# Patient Record
Sex: MALE | Race: BLACK OR AFRICAN AMERICAN | Employment: UNEMPLOYED
[De-identification: names, ages, dates, MRNs, and addresses within clinical notes are randomized per-mention and may not be internally consistent; named-entity substitution may affect disease eponyms.]

---

## 2022-02-17 ENCOUNTER — NURSE TRIAGE (OUTPATIENT)
Dept: OTHER | Facility: CLINIC | Age: 42
End: 2022-02-17

## 2022-02-17 NOTE — TELEPHONE ENCOUNTER
Received call from [address] at [facility] with Red Flag Complaint. Subjective: Caller states \"High blood sugar\"     Current Symptoms: blood sugar is high, out of NPH insulin. Just taking regular insulin, has been out of NPH for months, has no idea whet blood sugar is, meter is just reading HIGH. Caller states he had been living in [place] and just recently moved back. Caller was encouraged to go to ER for treatment of hyperglycemia, states he just wants to set up an appointment with a PCP    Onset: a few months ago; gradual    Associated Symptoms: NA    Pain Severity: none    Temperature: n/a     What has been tried: takes regular insulin    LMP: NA Pregnant: NA    Recommended disposition: ER now, caller refuses, wants to get an appointment to establish care only. Risks associated with prolonged hyperglycemia explained    Care advice provided, patient verbalizes understanding; denies any other questions or concerns; instructed to call back for any new or worsening symptoms. Patient/Caller agrees with recommended disposition; writer provided warm transfer to Jitendra Avilez at [facility] for appointment scheduling    Attention Provider: Thank you for allowing me to participate in the care of your patient. The patient was connected to triage in response to information provided to the ECC. Please do not respond through this encounter as the response is not directed to a shared pool.         Reason for Disposition   Blood glucose > 500 mg/dL (27.8 mmol/L)    Protocols used: DIABETES - HIGH BLOOD SUGAR-ADULT-OH

## 2022-02-25 NOTE — PROGRESS NOTES
Subjective:     Chief Complaint   Patient presents with   [address] [place] is a 39 y.o. M. He is noted to have a history of ongoing heroin use and active smoking. The patient also claims to have a history of diabetes. The patient has been seen most recently earlier this month in the emergency room with a complaint of right-sided foot pain. The patient had noted a history of chronic bilateral foot pain related to diabetic neuropathy. Gabapentin, which had been prescribed previously for this, was no longer effective. Physical examination demonstrated tachycardia, as well as a callus on the lateral aspect of the foot without surrounding erythema or warmth. Laboratory studies demonstrated significant hyperglycemia at 409 mg/dL, but was otherwise unremarkable. X-rays of the foot demonstrated no acute abnormality. The patient was felt not likely to have an acute infectious process, and was felt more likely to have an exacerbation of his neuropathy and chronic friction injury. The patient had been told to follow-up with podiatry, and was eventually discharged from the ER with instructions to further follow-up with primary care. Today, the patient comes in to establish care and to have follow-up care on his diabetes and peripheral neuropathy. His main complaint today relates to his peripheral neuropathy, which has been steadily worsening over the past year but has been present for several years. He has been treated for this previously with gabapentin, but no longer finds this effective, and has not taken this in some time. His diabetes has also been very poorly controlled. He has been able to get insulin over-the-counter, he says, and currently uses glargine 20 units at night along with 20 units of NovoLog, 3 times daily, with meals.   Despite this, his blood sugar readings have typically been in the 300 to 400 mg/dL range, with the lowest blood sugar readings that he has been able to get in the 280 mg/dL range. He denies any other hypoglycemic episodes. He continues to have polyuria and polydipsia, and also reports an abnormal weight loss over the past several months of approximately 90 pounds or so. He denies any fevers or chills, chest pain, shortness of breath, or any further cardiopulmonary concerns. His blood pressure readings have been typically about where they are in clinic today. He denies having seen the podiatrist before despite his history of peripheral neuropathy. He also notes similar symptoms of severe numbness, and tingling pain, in his hands as well as the soles of his feet. He brings in disability paperwork for completion, wondering if he will be able to work again given this problem. He does not currently take any medication for cholesterol management. He continues on methadone maintenance therapy for his prior history of heroin use. He denies any prior history of iggy, but does relate to some depression given his worsening health concerns. No suicidal or homicidal ideation. He declines Pneumovax and Tdap vaccination today when offered. His review of systems is otherwise unremarkable. Past Medical History:  Past Medical History:   Diagnosis Date    Current smoker     Diabetes mellitus ([facility] [address]     RX = insulin (historical, per patient)    Diabetic neuropathy ([facility]     RX = Cymbalta    Dyslipidemia     Heroin addiction ([facility]     Methadone maintenance therapy patient [facility])        Past Surgical Histor:  History reviewed. No pertinent surgical history.     Allergies:  No Known Allergies    Medications:  Current Outpatient Medications   Medication Sig Dispense Refill    insulin glargine (LANTUS,BASAGLAR) 100 unit/mL (3 mL) inpn Inject 30 units subcutaneous injection nightly and adjust as per provided scale  Indications: type 2 diabetes mellitus 3 Adjustable Dose Pre-filled Pen Syringe 5    insulin aspart U-100 (NOVOLOG) 100 unit/mL (3 mL) inpn Inject 20 units subcutaneous injection with meals 3 times daily and adjust as per provided scale  Indications: type 2 diabetes mellitus 3 Adjustable Dose Pre-filled Pen Syringe 5    DULoxetine (CYMBALTA) 30 mg capsule Take 1 Capsule by mouth daily for 120 days. Indications: diabetic complication causing injury to some body nerves 30 Capsule 3    METHADONE HCL (METHADONE PO) Take 65 mg by mouth.  oxycodone-acetaminophen (PERCOCET) 5-325 mg per tablet Take 1 Tab by mouth every four (4) hours as needed for Pain. 15 Tab 0    trimethoprim-sulfamethoxazole (BACTRIM DS) 800-160 mg per tablet Take 2 Tabs by mouth two (2) times a day. (Patient not taking: Reported on 3/16/2022) 40 Tab 0       Social History:  Social History     Socioeconomic History    Marital status: SINGLE   Tobacco Use    Smoking status: Current Every Day Smoker    Smokeless tobacco: Never Used   Substance and Sexual Activity    Alcohol use: Yes       Family History:  History reviewed. No pertinent family history. Immunizations: There is no immunization history on file for this patient. Healthcare Maintenance:  Health Maintenance   Topic Date Due    Hepatitis C Screening  Never done    Depression Screen  Never done    COVID-19 Vaccine (1) Never done    Pneumococcal 0-64 years (1 of 2 - PPSV23) Never done    DTaP/Tdap/Td series (1 - Tdap) Never done    Lipid Screen  Never done    Flu Vaccine (1) Never done        Review of Systems:  ROS:  Review of Systems   Constitutional: Positive for malaise/fatigue and weight loss. HENT: Negative. Eyes: Negative. Respiratory: Negative. Cardiovascular: Negative. Gastrointestinal: Negative. Genitourinary: Negative. Musculoskeletal: Negative. Skin: Negative. Neurological: Positive for tingling and sensory change. Negative for dizziness, tremors, speech change, focal weakness, seizures, loss of consciousness, weakness and headaches. Endo/Heme/Allergies: Negative. Psychiatric/Behavioral: Negative. ROS otherwise negative      Objective:     Vital Signs:  Visit Vitals  BP (!) 131/92 (BP 1 Location: Left upper arm, BP Patient Position: Sitting, BP Cuff Size: Adult)   Pulse 98   Temp 97.8 °F (36.6 °C) (Oral)   Ht 5' 7\" (1.702 m)   Wt 143 lb 4.8 oz (65 kg)   SpO2 100%   BMI 22.44 kg/m²       BMI:  Body mass index is 22.44 kg/m². Physical Examination:  Physical Exam  Vitals reviewed. Constitutional:       Appearance: Normal appearance. HENT:      Head: Normocephalic and atraumatic. Nose: Nose normal.      Mouth/Throat:      Mouth: Mucous membranes are moist.   Eyes:      Extraocular Movements: Extraocular movements intact. Conjunctiva/sclera: Conjunctivae normal.      Pupils: Pupils are equal, round, and reactive to light. Cardiovascular:      Rate and Rhythm: Normal rate and regular rhythm. Pulses: Normal pulses. Heart sounds: Normal heart sounds. No murmur heard. No friction rub. No gallop. Pulmonary:      Effort: Pulmonary effort is normal. No respiratory distress. Breath sounds: Normal breath sounds. No wheezing, rhonchi or rales. Abdominal:      General: Bowel sounds are normal. There is no distension. Palpations: Abdomen is soft. There is no mass. Tenderness: There is no abdominal tenderness. There is no guarding or rebound. Musculoskeletal:         General: No tenderness, deformity or signs of injury. Normal range of motion. Cervical back: Normal range of motion and neck supple. Right lower leg: No edema. Left lower leg: No edema. Skin:     General: Skin is warm and dry. Findings: No bruising, lesion or rash. Neurological:      General: No focal deficit present. Mental Status: He is alert and oriented to person, place, and time. Mental status is at baseline. Cranial Nerves: No cranial nerve deficit. Sensory: Sensory deficit present. Motor: No weakness.       Coordination: Coordination normal.      Gait: Gait abnormal.      Deep Tendon Reflexes: Reflexes normal.      Comments: Dense peripheral neuropathy bilateral feet, bilateral hands,  with callus formation R foot on sole    Psychiatric:         Mood and Affect: Mood normal.         Behavior: Behavior normal.          Physical exam otherwise negative    Diagnostic Testing:    Laboratory Studies:  Admission on 03/09/2022, Discharged on 03/09/2022   Component Date Value Ref Range Status    Glucose (POC) 03/09/2022 409* 65 - 117 mg/dL Final    Comment: (NOTE)  The FDA has indicated that no capillary point of care blood glucose  monitoring systems are approved for use in \"critically ill\" patients,  however they have not defined this population. The College of  American Pathologists has recommended that these devices should not  be used in cases such as severe hypotension, dehydration, shock, and  hyperglycemic-hyperosmolar state, amongst others. Venous or arterial  collection is the recommended specimen for testing these patients.  Performed by 03/09/2022 Simone Quant EDT   Final    WBC 03/09/2022 9.4  4.1 - 11.1 K/uL Final    RBC 03/09/2022 4.76  4.10 - 5.70 M/uL Final    HGB 03/09/2022 13.4  12.1 - 17.0 g/dL Final    HCT 03/09/2022 41.2  36.6 - 50.3 % Final    MCV 03/09/2022 86.6  80.0 - 99.0 FL Final    MCH 03/09/2022 28.2  26.0 - 34.0 PG Final    MCHC 03/09/2022 32.5  30.0 - 36.5 g/dL Final    RDW 03/09/2022 13.1  11.5 - 14.5 % Final    PLATELET 25/19/2653 588  150 - 400 K/uL Final    MPV 03/09/2022 9.8  8.9 - 12.9 FL Final    NRBC 03/09/2022 0.0  0  WBC Final    ABSOLUTE NRBC 03/09/2022 0.00  0.00 - 0.01 K/uL Final    NEUTROPHILS 03/09/2022 52  32 - 75 % Final    LYMPHOCYTES 03/09/2022 34  12 - 49 % Final    MONOCYTES 03/09/2022 8  5 - 13 % Final    EOSINOPHILS 03/09/2022 6  0 - 7 % Final    BASOPHILS 03/09/2022 0  0 - 1 % Final    IMMATURE GRANULOCYTES 03/09/2022 0  0.0 - 0.5 % Final    ABS. NEUTROPHILS 03/09/2022 4.9  1.8 - 8.0 K/UL Final    ABS. LYMPHOCYTES 03/09/2022 3.2  0.8 - 3.5 K/UL Final    ABS. MONOCYTES 03/09/2022 0.8  0.0 - 1.0 K/UL Final    ABS. EOSINOPHILS 03/09/2022 0.5* 0.0 - 0.4 K/UL Final    ABS. BASOPHILS 03/09/2022 0.0  0.0 - 0.1 K/UL Final    ABS. IMM. GRANS. 03/09/2022 0.0  0.00 - 0.04 K/UL Final    DF 03/09/2022 AUTOMATED    Final    Sodium 03/09/2022 134* 136 - 145 mmol/L Final    Potassium 03/09/2022 3.5  3.5 - 5.1 mmol/L Final    Chloride 03/09/2022 100  97 - 108 mmol/L Final    CO2 03/09/2022 26  21 - 32 mmol/L Final    Anion gap 03/09/2022 8  5 - 15 mmol/L Final    Glucose 03/09/2022 428* 65 - 100 mg/dL Final    BUN 03/09/2022 10  6 - 20 MG/DL Final    Creatinine 03/09/2022 1.11  0.70 - 1.30 MG/DL Final    BUN/Creatinine ratio 03/09/2022 9* 12 - 20   Final    GFR est AA 03/09/2022 >60  >60 ml/min/1.73m2 Final    GFR est non-AA 03/09/2022 >60  >60 ml/min/1.73m2 Final    Estimated GFR is calculated using the IDMS-traceable Modification of Diet in Renal Disease (MDRD) Study equation, reported for both  Americans (GFRAA) and non- Americans (GFRNA), and normalized to 1.73m2 body surface area. The physician must decide which value applies to the patient.  Calcium 03/09/2022 9.3  8.5 - 10.1 MG/DL Final    Bilirubin, total 03/09/2022 0.2  0.2 - 1.0 MG/DL Final    ALT (SGPT) 03/09/2022 27  12 - 78 U/L Final    AST (SGOT) 03/09/2022 12* 15 - 37 U/L Final    Alk. phosphatase 03/09/2022 86  45 - 117 U/L Final    Protein, total 03/09/2022 7.7  6.4 - 8.2 g/dL Final    Albumin 03/09/2022 3.0* 3.5 - 5.0 g/dL Final    Globulin 03/09/2022 4.7* 2.0 - 4.0 g/dL Final    A-G Ratio 03/09/2022 0.6* 1.1 - 2.2   Final         Radiographic Studies:  XR Results (most recent):  Results from Hospital Encounter encounter on 03/09/22    XR FOOT RT MIN 3 V    Narrative  EXAM: XR FOOT RT MIN 3 V    INDICATION: pain. COMPARISON: None.     FINDINGS: Three views of the right foot demonstrate no fracture or other acute  osseous or articular abnormality. The soft tissues are within normal limits. Small plantar calcaneal spur  . Mild DJD first metatarsophalangeal.    Impression  No acute abnormality. ANGE Results (most recent):  No results found for this or any previous visit. CT Results (most recent):  No results found for this or any previous visit. DEXA Results (most recent):  No results found for this or any previous visit. MRI Results (most recent):  No results found for this or any previous visit. Assessment/Plan:       ICD-10-CM ICD-9-CM    1. Routine adult health maintenance  Z00.00 V70.0 HIV 1/2 AG/AB, 4TH GENERATION,W RFLX CONFIRM      HEMOGLOBIN A1C WITH EAG      CBC WITH AUTOMATED DIFF      METABOLIC PANEL, COMPREHENSIVE      LIPID PANEL   2. Screening for viral disease  Z11.59 V73.99 HEPATITIS C AB      HIV 1/2 AG/AB, 4TH GENERATION,W RFLX CONFIRM   3. Need for hepatitis C screening test  Z11.59 V73.89 HEPATITIS C AB   4. Encounter for immunization  Z23 V03.89    5. Current smoker  F17.200 305.1    6. Heroin addiction ([facility] [address]  F11.20 304.00    7. Type 2 diabetes mellitus with diabetic polyneuropathy, with long-term current use of insulin (HCC)  E11.42 250.60 MICROALBUMIN, UR, RAND W/ MICROALB/CREAT RATIO    Z79.4 357.2 REFERRAL TO DIABETIC EDUCATION     V58.67 insulin glargine (LANTUS,BASAGLAR) 100 unit/mL (3 mL) inpn      insulin aspart U-100 (NOVOLOG) 100 unit/mL (3 mL) inpn   8. Other diabetic neurological complication associated with type 2 diabetes mellitus (HCC)  E11.49 250.60 DULoxetine (CYMBALTA) 30 mg capsule      REFERRAL TO PODIATRY   9. Hypertension, unspecified type  I10 401.9    10. Dyslipidemia  E78.5 272.4           This pleasant 70-year-old -American male patient with a history of uncontrolled diabetes mellitus presents for establishment.   His peripheral neuropathy is normal certainly secondary to diabetes, given his history, although consideration should be given for other potential causes and we should consider potential additional laboratory and formal nerve conduction study evaluation in the future should this not improve with improvement in his diabetic control. However, his primary problem today is his diabetes, with hyperglycemia likely contributing to his polyuria and polydipsia as well as his abnormal weight loss. He will continue with glargine and NovoLog. I will have him continue with 20 units of the NovoLog with meals 3 times a day for now, though I anticipate this going up over time. A supplemental sliding scale is also provided. He will begin adjusting the Lantus on his own, which is increased today to 30 units at night. I anticipate that he will require steady increasing of this medication, so he is provided a titration scale as noted. Referral is made to diabetic education. Consider formal referral to endocrinology, pending laboratory results. He will also require, in time, treatment for cholesterol management. Anticipate starting atorvastatin at his next visit. His blood pressure target is less than 130/80 mmHg. He is not at target today, and I anticipate seeing microalbuminuria on his laboratory studies. Holding off on lisinopril or other ACE inhibitor or ARB for now pending laboratory studies. For his peripheral neuropathy, a trial of Cymbalta is provided. Starting at 30 mg daily. I discussed with the patient potential adverse effects associated with this medication. Anticipate titrating this up to 60 mg/day over time, and potentially combining this with pregabalin. Holding off on this for now. Note that he is also currently taking methadone, which limits any QTC prolonging medications. He will follow up with me in 1 month. Anticipate starting additional medications as noted above at that point. INSULIN INSTRUCTIONS:  1.  Your new dose of Glargine is 30 units subcutaneous injection at night.                A. Measure your blood sugar prior to each meal (breakfast, lunch, dinner, and again at bedtime). B. Titrate your Glargine dose according to your fasting, pre-breakfast blood sugar reading.     i. Your goal fasting blood sugar reading is between 80 to 120 mg/dL. ii. If your fasting blood sugar is GREATER than 120 mg/dL for 3 CONSECUTIVE DAYS, increase your evening dose of Glargine by 2 units per dose.                iii. For example, if your current dose is 30 units subcutaneous injection at night, increase your dosing to 32 units at night. iv. Continue increasing your dose of Glargine in this fashion every 3 days until you have a morning blood sugar reading of less than 120 mg/dL. v. If your fasting blood sugar reading is less than 80 mg/dL for 3 consecutive days, decrease your evening dose by 2 units per dose. vi. If you have a low blood sugar reading of < 70 mg/dL at any point, do not increase your Glargine dosing any further, but decrease the dose as per above, and notify your treatment team.                                    If for 3 consecutive days your blood sugar is:       < 80 mg/dL  between  mg/dL > 120 mg/dL               Decrease the dose by 2 units Keep the same Lantus dose Increase the dose by 2 units                                               2. Short-acting insulin (novolog) dosing instructions:       A. Take 20 units of fast-acting insulin (Novolog) with each meal (breakfast, lunch, and dinner).                B. In addition to this, please add on additional insulin according to your blood sugar readings based upon the following scale:                         Blood Glucose Reading   Response                For Blood Sugar Readings Less than 75 mg/dL: Drink Juice, eat something, or call for medical attention              For Blood Sugar Readings between  mg/dL: Do Nothing     For Blood Sugar Readings between 150-230 mg/dL: Inject 2 units     For Blood Sugar Readings between 230-310 mg/dL: Inject 4 units     For Blood Sugar Readings between 310-390 mg/dL: Inject 6 units     For Blood Sugar Readings between 390-470 mg/dL: Inject 8 Units     For Blood Sugar Readings between 470-550 mg/dL: Inject 10 Units     For Blood Sugar Readings > than 550mg/dL: Inject 12 Units, call for medical attention       Follow-up and Dispositions    · Return in about 1 month (around 4/16/2022), or if symptoms worsen or fail to improve, for Routine Followup, BP Check. Ernestina Franco MD    Please note that this dictation was completed with "[employer] the computer voice recognition software. Quite often unanticipated grammatical, syntax, homophones, and other interpretive errors are inadvertently transcribed by the computer software. Please disregard these errors. Please excuse any errors that have escaped final proofreading.

## 2022-03-09 ENCOUNTER — APPOINTMENT (OUTPATIENT)
Dept: GENERAL RADIOLOGY | Age: 42
End: 2022-03-09
Attending: EMERGENCY MEDICINE
Payer: COMMERCIAL

## 2022-03-09 ENCOUNTER — HOSPITAL ENCOUNTER (EMERGENCY)
Age: 42
Discharge: HOME | End: 2022-03-09
Attending: EMERGENCY MEDICINE
Payer: COMMERCIAL

## 2022-03-09 VITALS
BODY MASS INDEX: 22.84 KG/M2 | DIASTOLIC BLOOD PRESSURE: 88 MMHG | TEMPERATURE: 97.8 F | HEART RATE: 101 BPM | OXYGEN SATURATION: 100 % | RESPIRATION RATE: 18 BRPM | SYSTOLIC BLOOD PRESSURE: 143 MMHG | WEIGHT: 145.5 LBS | HEIGHT: 67 IN

## 2022-03-09 DIAGNOSIS — T14.8XXA FRICTION INJURY TO SKIN: ICD-10-CM

## 2022-03-09 DIAGNOSIS — R73.9 HYPERGLYCEMIA: ICD-10-CM

## 2022-03-09 DIAGNOSIS — E10.49 OTHER DIABETIC NEUROLOGICAL COMPLICATION ASSOCIATED WITH TYPE 1 DIABETES MELLITUS (HCC): Primary | ICD-10-CM

## 2022-03-09 LAB
ALBUMIN SERPL-MCNC: 3 G/DL (ref 3.5–5)
ALBUMIN/GLOB SERPL: 0.6 {RATIO} (ref 1.1–2.2)
ALP SERPL-CCNC: 86 U/L (ref 45–117)
ALT SERPL-CCNC: 27 U/L (ref 12–78)
ANION GAP SERPL CALC-SCNC: 8 MMOL/L (ref 5–15)
AST SERPL-CCNC: 12 U/L (ref 15–37)
BASOPHILS # BLD: 0 K/UL (ref 0–0.1)
BASOPHILS NFR BLD: 0 % (ref 0–1)
BILIRUB SERPL-MCNC: 0.2 MG/DL (ref 0.2–1)
BUN SERPL-MCNC: 10 MG/DL (ref 6–20)
BUN/CREAT SERPL: 9 (ref 12–20)
CALCIUM SERPL-MCNC: 9.3 MG/DL (ref 8.5–10.1)
CHLORIDE SERPL-SCNC: 100 MMOL/L (ref 97–108)
CO2 SERPL-SCNC: 26 MMOL/L (ref 21–32)
CREAT SERPL-MCNC: 1.11 MG/DL (ref 0.7–1.3)
DIFFERENTIAL METHOD BLD: ABNORMAL
EOSINOPHIL # BLD: 0.5 K/UL (ref 0–0.4)
EOSINOPHIL NFR BLD: 6 % (ref 0–7)
ERYTHROCYTE [DISTWIDTH] IN BLOOD BY AUTOMATED COUNT: 13.1 % (ref 11.5–14.5)
GLOBULIN SER CALC-MCNC: 4.7 G/DL (ref 2–4)
GLUCOSE BLD STRIP.AUTO-MCNC: 409 MG/DL (ref 65–117)
GLUCOSE SERPL-MCNC: 428 MG/DL (ref 65–100)
HCT VFR BLD AUTO: 41.2 % (ref 36.6–50.3)
HGB BLD-MCNC: 13.4 G/DL (ref 12.1–17)
IMM GRANULOCYTES # BLD AUTO: 0 K/UL (ref 0–0.04)
IMM GRANULOCYTES NFR BLD AUTO: 0 % (ref 0–0.5)
LYMPHOCYTES # BLD: 3.2 K/UL (ref 0.8–3.5)
LYMPHOCYTES NFR BLD: 34 % (ref 12–49)
MCH RBC QN AUTO: 28.2 PG (ref 26–34)
MCHC RBC AUTO-ENTMCNC: 32.5 G/DL (ref 30–36.5)
MCV RBC AUTO: 86.6 FL (ref 80–99)
MONOCYTES # BLD: 0.8 K/UL (ref 0–1)
MONOCYTES NFR BLD: 8 % (ref 5–13)
NEUTS SEG # BLD: 4.9 K/UL (ref 1.8–8)
NEUTS SEG NFR BLD: 52 % (ref 32–75)
NRBC # BLD: 0 K/UL (ref 0–0.01)
NRBC BLD-RTO: 0 PER 100 WBC
PLATELET # BLD AUTO: 306 K/UL (ref 150–400)
PMV BLD AUTO: 9.8 FL (ref 8.9–12.9)
POTASSIUM SERPL-SCNC: 3.5 MMOL/L (ref 3.5–5.1)
PROT SERPL-MCNC: 7.7 G/DL (ref 6.4–8.2)
RBC # BLD AUTO: 4.76 M/UL (ref 4.1–5.7)
SERVICE CMNT-IMP: ABNORMAL
SODIUM SERPL-SCNC: 134 MMOL/L (ref 136–145)
WBC # BLD AUTO: 9.4 K/UL (ref 4.1–11.1)

## 2022-03-09 PROCEDURE — 36415 COLL VENOUS BLD VENIPUNCTURE: CPT

## 2022-03-09 PROCEDURE — 99284 EMERGENCY DEPT VISIT MOD MDM: CPT

## 2022-03-09 PROCEDURE — 80053 COMPREHEN METABOLIC PANEL: CPT

## 2022-03-09 PROCEDURE — 74011636637 HC RX REV CODE- 636/637: Performed by: EMERGENCY MEDICINE

## 2022-03-09 PROCEDURE — 73630 X-RAY EXAM OF FOOT: CPT

## 2022-03-09 PROCEDURE — 85025 COMPLETE CBC W/AUTO DIFF WBC: CPT

## 2022-03-09 PROCEDURE — 82962 GLUCOSE BLOOD TEST: CPT

## 2022-03-09 RX ADMIN — Medication 12 UNITS: at 20:06

## 2022-03-10 NOTE — ED NOTES
Per Simona HUGHES no need to recheck blood glucose prior to discharge. Patient discharged, discharge instructions provided to patient and reviewed, all questions answered.  Patient ambulatory on discharge

## 2022-03-10 NOTE — ED PROVIDER NOTES
EMERGENCY DEPARTMENT HISTORY AND PHYSICAL EXAM      Date: 3/9/2022  Patient Name: Katey Estrella [facility]    Please note that this dictation was completed with [employer], the computer voice recognition software. Quite often unanticipated grammatical, syntax, homophones, and other interpretive errors are inadvertently transcribed by the computer software. Please disregard these errors. Please excuse any errors that have escaped final proofreading. History of Presenting Illness     Chief Complaint   Patient presents with    Wound Check     pt ambulatory into triage with cc of a foot ulcer. pt has hx of DM and experiences peripheral neuropathy in his feet       History Provided By: Patient     HPI: Dari Hutchinson, 39 y.o. male, with a past medical history significant for heroin use, complaining of right foot pain, and bilateral foot pain. Patient states he has chronic bilateral foot pain related neuropathy, he is diabetic. Insulin-dependent. He states that his right foot is more painful than his left and has been for the last several days. He noticed a callus on the right lateral fifth metatarsophalangeal area and became concerned because his father had a foot infection and lost his foot. Patient denies any fevers or chills. Denies any injury or trauma. No other exacerbating relieving factors. Has been on gabapentin in the past for neuropathy and states that this does not help his pain    PCP: Nano Clarke MD    No current facility-administered medications on file prior to encounter. Current Outpatient Medications on File Prior to Encounter   Medication Sig Dispense Refill    METHADONE HCL (METHADONE PO) Take 65 mg by mouth.  oxycodone-acetaminophen (PERCOCET) 5-325 mg per tablet Take 1 Tab by mouth every four (4) hours as needed for Pain. 15 Tab 0    trimethoprim-sulfamethoxazole (BACTRIM DS) 800-160 mg per tablet Take 2 Tabs by mouth two (2) times a day.  40 Tab 0       Past History Past Medical History:  Past Medical History:   Diagnosis Date    Current smoker     Heroin addiction [facility])        Past Surgical History:  No past surgical history on file. Family History:  No family history on file. Social History:  Social History     Tobacco Use    Smoking status: Current Every Day Smoker    Smokeless tobacco: Not on file   Substance Use Topics    Alcohol use: Yes    Drug use: Not on file       Allergies:  No Known Allergies      Review of Systems   Review of Systems   Constitutional: Negative for chills and fever. HENT: Negative for congestion and sore throat. Eyes: Negative for visual disturbance. Respiratory: Negative for cough and shortness of breath. Cardiovascular: Negative for chest pain and leg swelling. Gastrointestinal: Negative for abdominal pain, blood in stool, diarrhea and nausea. Endocrine: Negative for polyuria. Genitourinary: Negative for dysuria and testicular pain. Musculoskeletal: Negative for arthralgias, joint swelling and myalgias. Skin:        See HPI   Allergic/Immunologic: Negative for immunocompromised state. Neurological: Negative for weakness and headaches. Hematological: Does not bruise/bleed easily. Psychiatric/Behavioral: Negative for confusion. Physical Exam   Physical Exam  Vitals and nursing note reviewed. Constitutional:       Appearance: He is well-developed. HENT:      Head: Normocephalic and atraumatic. Eyes:      General:         Right eye: No discharge. Left eye: No discharge. Conjunctiva/sclera: Conjunctivae normal.      Pupils: Pupils are equal, round, and reactive to light. Neck:      Trachea: No tracheal deviation. Cardiovascular:      Rate and Rhythm: Regular rhythm. Tachycardia present. Heart sounds: Normal heart sounds. No murmur heard. Pulmonary:      Effort: Pulmonary effort is normal. No respiratory distress. Breath sounds: Normal breath sounds.  No wheezing or rales.   Abdominal:      General: Bowel sounds are normal.      Palpations: Abdomen is soft. Tenderness: There is no abdominal tenderness. There is no guarding or rebound. Musculoskeletal:         General: No tenderness or deformity. Normal range of motion. Cervical back: Normal range of motion and neck supple. Skin:     General: Skin is warm and dry. Findings: No erythema or rash. Comments: There is a callus on the lateral aspect of the foot at the fifth metatarsophalangeal area. He has no surrounding erythema, no warmth. Palpable pulses in the DP and PT. Normal cap refill. Neurological:      Mental Status: He is alert and oriented to person, place, and time. Psychiatric:         Behavior: Behavior normal.         Diagnostic Study Results     Labs -     Recent Results (from the past 12 hour(s))   GLUCOSE, POC    Collection Time: 03/09/22  7:00 PM   Result Value Ref Range    Glucose (POC) 409 (H) 65 - 117 mg/dL    Performed by [facility]    CBC WITH AUTOMATED DIFF    Collection Time: 03/09/22  7:51 PM   Result Value Ref Range    WBC 9.4 4.1 - 11.1 K/uL    RBC 4.76 4.10 - 5.70 M/uL    HGB 13.4 12.1 - 17.0 g/dL    HCT 41.2 36.6 - 50.3 %    MCV 86.6 80.0 - 99.0 FL    MCH 28.2 26.0 - 34.0 PG    MCHC 32.5 30.0 - 36.5 g/dL    RDW 13.1 11.5 - 14.5 %    PLATELET 436 459 - 831 K/uL    MPV 9.8 8.9 - 12.9 FL    NRBC 0.0 0  WBC    ABSOLUTE NRBC 0.00 0.00 - 0.01 K/uL    NEUTROPHILS 52 32 - 75 %    LYMPHOCYTES 34 12 - 49 %    MONOCYTES 8 5 - 13 %    EOSINOPHILS 6 0 - 7 %    BASOPHILS 0 0 - 1 %    IMMATURE GRANULOCYTES 0 0.0 - 0.5 %    ABS. NEUTROPHILS 4.9 1.8 - 8.0 K/UL    ABS. LYMPHOCYTES 3.2 0.8 - 3.5 K/UL    ABS. MONOCYTES 0.8 0.0 - 1.0 K/UL    ABS. EOSINOPHILS 0.5 (H) 0.0 - 0.4 K/UL    ABS. BASOPHILS 0.0 0.0 - 0.1 K/UL    ABS. IMM.  GRANS. 0.0 0.00 - 0.04 K/UL    DF AUTOMATED     METABOLIC PANEL, COMPREHENSIVE    Collection Time: 03/09/22  7:51 PM   Result Value Ref Range    Sodium 134 (L) 136 - 145 mmol/L    Potassium 3.5 3.5 - 5.1 mmol/L    Chloride 100 97 - 108 mmol/L    CO2 26 21 - 32 mmol/L    Anion gap 8 5 - 15 mmol/L    Glucose 428 (H) 65 - 100 mg/dL    BUN 10 6 - 20 MG/DL    Creatinine 1.11 0.70 - 1.30 MG/DL    BUN/Creatinine ratio 9 (L) 12 - 20      GFR est AA >60 >60 ml/min/1.73m2    GFR est non-AA >60 >60 ml/min/1.73m2    Calcium 9.3 8.5 - 10.1 MG/DL    Bilirubin, total 0.2 0.2 - 1.0 MG/DL    ALT (SGPT) 27 12 - 78 U/L    AST (SGOT) 12 (L) 15 - 37 U/L    Alk. phosphatase 86 45 - 117 U/L    Protein, total 7.7 6.4 - 8.2 g/dL    Albumin 3.0 (L) 3.5 - 5.0 g/dL    Globulin 4.7 (H) 2.0 - 4.0 g/dL    A-G Ratio 0.6 (L) 1.1 - 2.2         Radiologic Studies -   XR FOOT RT MIN 3 V   Final Result   No acute abnormality. CT Results  (Last 48 hours)    None        CXR Results  (Last 48 hours)    None            Medical Decision Making   I am the first provider for this patient. I reviewed the vital signs, available nursing notes, past medical history, past surgical history, family history and social history. Vital Signs-Reviewed the patient's vital signs. Patient Vitals for the past 12 hrs:   Temp Pulse Resp BP SpO2   03/09/22 1853 97.8 °F (36.6 °C) (!) 101 18 (!) 143/88 100 %           Records Reviewed:   Nursing notes, Prior visits     Provider Notes (Medical Decision Making):   Doubt acute infectious process at this time. Likely exacerbation of neuropathy and chronic friction injury. Patient needs better glucose control, sugar noted to be elevated. We will check some basic labs given elevated sugar. Will have patient follow-up with podiatry. At this time based off the history and physical exam I do not think there is any need for admission, will check an x-ray just to make sure there is no evidence of significant erosion of the joint    ED Course:   Initial assessment performed.  The patients presenting problems have been discussed, and they are in agreement with the care plan formulated and outlined with them. I have encouraged them to ask questions as they arise throughout their visit. Critical Care Time:   none    Disposition:    DISCHARGE NOTE  Patients results have been reviewed with them. Patient and/or family have verbally conveyed their understanding and agreement of the patient's signs, symptoms, diagnosis, treatment and prognosis and additionally agree to follow up as recommended or return to the Emergency Room should their condition change or have any new concerns prior to their follow-up appointment. Patient verbally agrees with the care-plan and verbally conveys that all of their questions have been answered. Discharge instructions have also been provided to the patient with some educational information regarding their diagnosis as well a list of reasons why they would want to return to the ER prior to their follow-up appointment should their condition change. PLAN:  1. Discharge Medication List as of 3/9/2022  8:51 PM        2. Follow-up Information     Follow up With Specialties Details Why Contact Info    Rocky Ramon MD [facility] Schedule an appointment as soon as possible for a visit  regarding your blood sugar [address] [facility] [address]  [facility] [phone]      David Thomas DPM Podiatry Schedule an appointment as soon as possible for a visit  regarding your feet [address] [facility]  [phone]      \[facility] [place] [facility]\"" EMERGENCY DEPT Emergency Medicine  If symptoms worsen [address]  [address]  [phone]          Return to ED if worse     Diagnosis     Clinical Impression:   1. Other diabetic neurological complication associated with type 1 diabetes mellitus ([address]    2. Hyperglycemia    3. Friction injury to skin        Attestations:   This note was completed by Tika Flowers DO

## 2022-03-16 ENCOUNTER — OFFICE VISIT (OUTPATIENT)
Dept: INTERNAL MEDICINE CLINIC | Age: 42
End: 2022-03-16
Payer: COMMERCIAL

## 2022-03-16 VITALS
TEMPERATURE: 97.8 F | SYSTOLIC BLOOD PRESSURE: 131 MMHG | DIASTOLIC BLOOD PRESSURE: 92 MMHG | HEART RATE: 98 BPM | OXYGEN SATURATION: 100 % | BODY MASS INDEX: 22.49 KG/M2 | HEIGHT: 67 IN | WEIGHT: 143.3 LBS

## 2022-03-16 DIAGNOSIS — E11.49 OTHER DIABETIC NEUROLOGICAL COMPLICATION ASSOCIATED WITH TYPE 2 DIABETES MELLITUS (HCC): ICD-10-CM

## 2022-03-16 DIAGNOSIS — Z23 ENCOUNTER FOR IMMUNIZATION: ICD-10-CM

## 2022-03-16 DIAGNOSIS — F11.20 HEROIN ADDICTION (HCC): ICD-10-CM

## 2022-03-16 DIAGNOSIS — I10 HYPERTENSION, UNSPECIFIED TYPE: ICD-10-CM

## 2022-03-16 DIAGNOSIS — Z11.59 SCREENING FOR VIRAL DISEASE: ICD-10-CM

## 2022-03-16 DIAGNOSIS — Z11.59 NEED FOR HEPATITIS C SCREENING TEST: ICD-10-CM

## 2022-03-16 DIAGNOSIS — E11.42 TYPE 2 DIABETES MELLITUS WITH DIABETIC POLYNEUROPATHY, WITH LONG-TERM CURRENT USE OF INSULIN (HCC): ICD-10-CM

## 2022-03-16 DIAGNOSIS — F17.200 CURRENT SMOKER: ICD-10-CM

## 2022-03-16 DIAGNOSIS — Z79.4 TYPE 2 DIABETES MELLITUS WITH DIABETIC POLYNEUROPATHY, WITH LONG-TERM CURRENT USE OF INSULIN (HCC): ICD-10-CM

## 2022-03-16 DIAGNOSIS — Z00.00 ROUTINE ADULT HEALTH MAINTENANCE: Primary | ICD-10-CM

## 2022-03-16 DIAGNOSIS — E78.5 DYSLIPIDEMIA: ICD-10-CM

## 2022-03-16 PROCEDURE — 99205 OFFICE O/P NEW HI 60 MIN: CPT | Performed by: INTERNAL MEDICINE

## 2022-03-16 RX ORDER — DULOXETIN HYDROCHLORIDE 30 MG/1
30 CAPSULE, DELAYED RELEASE ORAL DAILY
Qty: 30 CAPSULE | Refills: 3 | Status: SHIPPED | OUTPATIENT
Start: 2022-03-16 | End: 2022-04-26 | Stop reason: DRUGHIGH

## 2022-03-16 RX ORDER — INSULIN ASPART 100 [IU]/ML
INJECTION, SOLUTION INTRAVENOUS; SUBCUTANEOUS
Qty: 3 ADJUSTABLE DOSE PRE-FILLED PEN SYRINGE | Refills: 5 | Status: SHIPPED | OUTPATIENT
Start: 2022-03-16

## 2022-03-16 RX ORDER — INSULIN GLARGINE 100 [IU]/ML
INJECTION, SOLUTION SUBCUTANEOUS
Qty: 3 ADJUSTABLE DOSE PRE-FILLED PEN SYRINGE | Refills: 5 | Status: SHIPPED | OUTPATIENT
Start: 2022-03-16 | End: 2022-10-21 | Stop reason: SDUPTHER

## 2022-03-16 NOTE — PROGRESS NOTES
Chief Complaint   Patient presents with    Establish Care       Visit Vitals  BP (!) 131/92 (BP 1 Location: Left upper arm, BP Patient Position: Sitting, BP Cuff Size: Adult)   Pulse 98   Temp 97.8 °F (36.6 °C) (Oral)   Ht 5' 7\" (1.702 m)   Wt 143 lb 4.8 oz (65 kg)   SpO2 100%   BMI 22.44 kg/m²       1. Have you been to the ER, urgent care clinic since your last visit? Hospitalized since your last visit? No    2. Have you seen or consulted any other health care providers outside of the [address] [place] [address] since your last visit? Include any pap smears or colon screening.  No

## 2022-03-16 NOTE — PATIENT INSTRUCTIONS
Insulin Instructions:    1. Your new dose of Glargine is 30 units subcutaneous injection at night. A. Measure your blood sugar prior to each meal (breakfast, lunch, dinner, and again at bedtime). B. Titrate your Glargine dose according to your fasting, pre-breakfast blood sugar reading.     i. Your goal fasting blood sugar reading is between 80 to 120 mg/dL. ii. If your fasting blood sugar is GREATER than 120 mg/dL for 3 CONSECUTIVE DAYS, increase your evening dose of Glargine by 2 units per dose.                iii. For example, if your current dose is 30 units subcutaneous injection at night, increase your dosing to 32 units at night. iv. Continue increasing your dose of Glargine in this fashion every 3 days until you have a morning blood sugar reading of less than 120 mg/dL. v. If your fasting blood sugar reading is less than 80 mg/dL for 3 consecutive days, decrease your evening dose by 2 units per dose. vi. If you have a low blood sugar reading of < 70 mg/dL at any point, do not increase your Glargine dosing any further, but decrease the dose as per above, and notify your treatment team.                                    If for 3 consecutive days your blood sugar is:       < 80 mg/dL  between  mg/dL > 120 mg/dL               Decrease the dose by 2 units Keep the same Lantus dose Increase the dose by 2 units                                               2. Short-acting insulin (novolog) dosing instructions:       A. Take 20 units of fast-acting insulin (Novolog) with each meal (breakfast, lunch, and dinner).                B. In addition to this, please add on additional insulin according to your blood sugar readings based upon the following scale:                         Blood Glucose Reading   Response                For Blood Sugar Readings Less than 75 mg/dL: Drink Juice, eat something, or call for medical attention              For Blood Sugar Readings between  mg/dL: Do Nothing     For Blood Sugar Readings between 150-230 mg/dL: Inject 2 units     For Blood Sugar Readings between 230-310 mg/dL: Inject 4 units     For Blood Sugar Readings between 310-390 mg/dL: Inject 6 units     For Blood Sugar Readings between 390-470 mg/dL: Inject 8 Units     For Blood Sugar Readings between 470-550 mg/dL: Inject 10 Units     For Blood Sugar Readings > than 550mg/dL: Inject 12 Units, call for medical attention          Learning About Benefits From Quitting Smoking  How does quitting smoking make you healthier? If you're thinking about quitting smoking, you may have a few reasons to be smoke-free. Your health may be one of them. · When you quit smoking, you lower your risks for cancer, lung disease, heart attack, stroke, blood vessel disease, and blindness from macular degeneration. · When you're smoke-free, you get sick less often, and you heal faster. You are less likely to get colds, flu, bronchitis, and pneumonia. · As a nonsmoker, you may find that your mood is better and you are less stressed. When and how will you feel healthier? Quitting has real health benefits that start from day 1 of being smoke-free. And the longer you stay smoke-free, the healthier you get and the better you feel. The first hours  · After just 20 minutes, your blood pressure and heart rate go down. That means there's less stress on your heart and blood vessels. · Within 12 hours, the level of carbon monoxide in your blood drops back to normal. That makes room for more oxygen. With more oxygen in your body, you may notice that you have more energy than when you smoked. After 2 weeks  · Your lungs start to work better. · Your risk of heart attack starts to drop. After 1 month  · When your lungs are clear, you cough less and breathe deeper, so it's easier to be active. · Your sense of taste and smell return.  That means you can enjoy food more than you have since you started smoking. Over the years  · Over the years, your risks of heart disease, heart attack, and stroke are lower. · After 10 years, your risk of dying from lung cancer is cut by about half. And your risk for many other types of cancer is lower too. How would quitting help others in your life? When you quit smoking, you improve the health of everyone who now breathes in your smoke. · Their heart, lung, and cancer risks drop, much like yours. · They are sick less. For babies and small children, living smoke-free means they're less likely to have ear infections, pneumonia, and bronchitis. · If you're a woman who is or will be pregnant someday, quitting smoking means a healthier . · Children who are close to you are less likely to become adult smokers. Where can you learn more? Go to http://www.gray.com/  Enter O319 in the search box to learn more about \"Learning About Benefits From Quitting Smoking. \"  Current as of: 2021               Content Version: 13.2   [employer]. Care instructions adapted under license by [employer] (which disclaims liability or warranty for this information). If you have questions about a medical condition or this instruction, always ask your healthcare professional. [address] any warranty or liability for your use of this information. Learning About the [address] Diet  What is the Mediterranean diet? The Mediterranean diet is a style of eating rather than a diet plan. It features foods eaten in [place], [place], [place] and [place], and other countries along the [facility]. It emphasizes eating foods like fish, fruits, vegetables, beans, high-fiber breads and whole grains, nuts, and olive oil. This style of eating includes limited red meat, cheese, and sweets. Why choose the Mediterranean diet?   A Mediterranean-style diet may improve heart health. It contains more fat than other heart-healthy diets. But the fats are mainly from nuts, unsaturated oils (such as fish oils and olive oil), and certain nut or seed oils (such as canola, soybean, or flaxseed oil). These fats may help protect the heart and blood vessels. How can you get started on the Mediterranean diet? Here are some things you can do to switch to a more Mediterranean way of eating. What to eat  · Eat a variety of fruits and vegetables each day, such as grapes, blueberries, tomatoes, broccoli, peppers, figs, olives, spinach, eggplant, beans, lentils, and chickpeas. · Eat a variety of whole-grain foods each day, such as oats, brown rice, and whole wheat bread, pasta, and couscous. · Eat fish at least 2 times a week. Try tuna, salmon, mackerel, lake trout, herring, or sardines. · Eat moderate amounts of low-fat dairy products, such as milk, cheese, or yogurt. · Eat moderate amounts of poultry and eggs. · Choose healthy (unsaturated) fats, such as nuts, olive oil, and certain nut or seed oils like canola, soybean, and flaxseed. · Limit unhealthy (saturated) fats, such as butter, palm oil, and coconut oil. And limit fats found in animal products, such as meat and dairy products made with whole milk. Try to eat red meat only a few times a month in very small amounts. · Limit sweets and desserts to only a few times a week. This includes sugar-sweetened drinks like soda. The Mediterranean diet may also include red wine with your meal--1 glass each day for women and up to 2 glasses a day for men. Tips for eating at home  · Use herbs, spices, garlic, lemon zest, and citrus juice instead of salt to add flavor to foods. · Add avocado slices to your sandwich instead of rosales. · Have fish for lunch or dinner instead of red meat. Brush the fish with olive oil, and broil or grill it. · Sprinkle your salad with seeds or nuts instead of cheese.   · Zhang Bauman with olive or canola oil instead of butter or oils that are high in saturated fat. · Switch from 2% milk or whole milk to 1% or fat-free milk. · Dip raw vegetables in a vinaigrette dressing or hummus instead of dips made from mayonnaise or sour cream.  · Have a piece of fruit for dessert instead of a piece of cake. Try baked apples, or have some dried fruit. Tips for eating out  · Try broiled, grilled, baked, or poached fish instead of having it fried or breaded. · Ask your  to have your meals prepared with olive oil instead of butter. · Order dishes made with marinara sauce or sauces made from olive oil. Avoid sauces made from cream or mayonnaise. · Choose whole-grain breads, whole wheat pasta and pizza crust, brown rice, beans, and lentils. · Cut back on butter or margarine on bread. Instead, you can dip your bread in a small amount of olive oil. · Ask for a side salad or grilled vegetables instead of french fries or chips. Where can you learn more? Go to http://www.okeefe.com/  Enter O407 in the search box to learn more about \"Learning About the Mediterranean Diet. \"  Current as of: September 8, 2021               Content Version: 13.2  © 2058-8530 [employer]. Care instructions adapted under license by [employer] (which disclaims liability or warranty for this information). If you have questions about a medical condition or this instruction, always ask your healthcare professional. [place] [address] any warranty or liability for your use of this information. Duloxetine (By mouth)   Duloxetine (doo-LOX-[id number]-teen)  Treats depression, anxiety, diabetic peripheral neuropathy, fibromyalgia, and chronic muscle or bone pain. This medicine is an SSNRI. Brand Name(s): Cymbalta, DermacinRx Silvino Azar   There may be other brand names for this medicine. When This Medicine Should Not Be Used: This medicine is not right for everyone. Do not use it if you had an allergic reaction to duloxetine. How to Use This Medicine:   Capsule, Delayed Release Capsule  · Take your medicine as directed. Your dose may need to be changed several times to find what works best for you. · Delayed-release capsule: Swallow the capsule whole. Do not crush, chew, break, or open it. · This medicine should come with a Medication Guide. Ask your pharmacist for a copy if you do not have one. · Missed dose: Take a dose as soon as you remember. If it is almost time for your next dose, wait until then and take a regular dose. Do not take extra medicine to make up for a missed dose. · Store the medicine in a closed container at room temperature, away from heat, moisture, and direct light. Drugs and Foods to Avoid:   Ask your doctor or pharmacist before using any other medicine, including over-the-counter medicines, vitamins, and herbal products. · Do not take duloxetine if you have used an MAO inhibitor (MAOI) within the past 14 days. Do not start taking an MAO inhibitor within 5 days of stopping duloxetine. · Some medicines can affect how duloxetine works. Tell your doctor if you are using any of the following:  ¨ Buspirone, cimetidine, ciprofloxacin, enoxacin, fentanyl, lithium, Filiberto's wort, theophylline, tramadol, tryptophan, or warfarin  ¨ Amphetamines  ¨ Blood pressure medicine  ¨ Diuretic (water pill)  ¨ Medicine for heart rhythm problems (including flecainide, propafenone, quinidine)  ¨ Medicine to treat migraine headaches (including triptans)  ¨ NSAID pain or arthritis medicine (including aspirin, celecoxib, diclofenac, ibuprofen, naproxen)  ¨ Other medicine to treat depression or mood disorders (including amitriptyline, desipramine, fluoxetine, imipramine, nortriptyline, paroxetine)  ¨ Phenothiazine medicine (including thioridazine)  · Tell your doctor if you use anything else that makes you sleepy.  Some examples are allergy medicine, narcotic pain medicine, and alcohol. · Do not drink alcohol while you are using this medicine. Warnings While Using This Medicine:   · Tell your doctor if you are pregnant or breastfeeding, or if you have kidney disease, liver disease, diabetes, digestion problems, glaucoma, heart disease, high or low blood pressure, or problems with urination. Tell your doctor if you smoke or you have a history of seizures, or drug or alcohol addiction. · This medicine may cause the following problems:   ¨ Serious liver problems  ¨ Serotonin syndrome (more likely when used with certain other medicines)  ¨ Increased risk of bleeding problems  ¨ Serious skin reactions  ¨ Low sodium levels in the blood  · This medicine can increase thoughts of suicide. Tell your doctor right away if you start to feel depressed and have thoughts about hurting yourself. · This medicine can cause changes in your blood pressure. This may make you dizzy or drowsy. Do not drive or do anything that could be dangerous until you know how this medicine affects you. Stand up slowly to avoid falls. · Do not stop using this medicine suddenly. Your doctor will need to slowly decrease your dose before you stop it completely. · Your doctor will check your progress and the effects of this medicine at regular visits. Keep all appointments. · Keep all medicine out of the reach of children. Never share your medicine with anyone.   Possible Side Effects While Using This Medicine:   Call your doctor right away if you notice any of these side effects:  · Allergic reaction: Itching or hives, swelling in your face or hands, swelling or tingling in your mouth or throat, chest tightness, trouble breathing  · Anxiety, restlessness, fever, fast heartbeat, sweating, muscle spasms, diarrhea, seeing or hearing things that are not there  · Blistering, peeling, red skin rash  · Confusion, weakness, muscle twitching  · Dark urine or pale stools, nausea, vomiting, loss of appetite, stomach pain, yellow skin or eyes  · Decrease in how much or how often you urinate  · Eye pain, vision changes, seeing halos around lights  · Feeling more energetic than usual  · Lightheadedness, dizziness, or fainting  · Unusual moods or behaviors, worsening depression, thoughts about hurting yourself, trouble sleeping  · Unusual bleeding or bruising  If you notice these less serious side effects, talk with your doctor:   · Decrease in appetite or weight  · Dry mouth, constipation, mild nausea  · Unusual drowsiness, sleepiness, or tiredness  If you notice other side effects that you think are caused by this medicine, tell your doctor. Call your doctor for medical advice about side effects. You may report side effects to FDA at 1-009-PDL-6199  © 2017 [facility] Information is for End User's use only and may not be sold, redistributed or otherwise used for commercial purposes. The above information is an  only. It is not intended as medical advice for individual conditions or treatments. Talk to your doctor, nurse or pharmacist before following any medical regimen to see if it is safe and effective for you. Diabetes Foot Health: Care Instructions  Your Care Instructions     When you have diabetes, your feet need extra care and attention. Diabetes can damage the nerve endings and blood vessels in your feet, making you less likely to notice when your feet are injured. Diabetes also limits your body's ability to fight infection and get blood to areas that need it. If you get a minor foot injury, it could become an ulcer or a serious infection. With good foot care, you can prevent most of these problems. Caring for your feet can be quick and easy. Most of the care can be done when you are bathing or getting ready for bed. Follow-up care is a key part of your treatment and safety. Be sure to make and go to all appointments, and call your doctor if you are having problems.  It's also a good idea to know your test results and keep a list of the medicines you take. How can you care for yourself at home? · Keep your blood sugar close to normal by watching what and how much you eat, monitoring blood sugar, taking medicines if prescribed, and getting regular exercise. · Do not smoke. Smoking affects blood flow and can make foot problems worse. If you need help quitting, talk to your doctor about stop-smoking programs and medicines. These can increase your chances of quitting for good. · Eat a diet that is low in fats. High fat intake can cause fat to build up in your blood vessels and decrease blood flow. · Inspect your feet daily for blisters, cuts, cracks, or sores. If you cannot see well, use a mirror or have someone help you. · Take care of your feet:  ? Wash your feet every day. Use warm (not hot) water. Check the water temperature with your wrists or other part of your body, not your feet. ? Dry your feet well. Pat them dry. Do not rub the skin on your feet too hard. Dry well between your toes. If the skin on your feet stays moist, bacteria or a fungus can grow, which can lead to infection. ? Keep your skin soft. Use moisturizing skin cream to keep the skin on your feet soft and prevent calluses and cracks. But do not put the cream between your toes, and stop using any cream that causes a rash. ? Clean underneath your toenails carefully. Do not use a sharp object to clean underneath your toenails. Use the blunt end of a nail file or other rounded tool. ? Trim and file your toenails straight across to prevent ingrown toenails. Use a nail clipper, not scissors. Use an emery board to smooth the edges. · Change socks daily. Socks without seams are best, because seams often rub the feet. You can find socks for people with diabetes from specialty catalogs. · Look inside your shoes every day for things like gravel or torn linings, which could cause blisters or sores.   · Buy shoes that fit well:  ? Look for shoes that have plenty of space around the toes. This helps prevent bunions and blisters. ? Try on shoes while wearing the kind of socks you will usually wear with the shoes. ? Avoid plastic shoes. They may rub your feet and cause blisters. Good shoes should be made of materials that are flexible and breathable, such as leather or cloth. ? Break in new shoes slowly by wearing them for no more than an hour a day for several days. Take extra time to check your feet for red areas, blisters, or other problems after you wear new shoes. · Do not go barefoot. Do not wear sandals, and do not wear shoes with very thin soles. Thin soles are easy to puncture. They also do not protect your feet from hot pavement or cold weather. · Have your doctor check your feet during each visit. If you have a foot problem, see your doctor. Do not try to treat an early foot problem at home. Home remedies or treatments that you can buy without a prescription (such as corn removers) can be harmful. · Always get early treatment for foot problems. A minor irritation can lead to a major problem if not properly cared for early. When should you call for help? Call your doctor now or seek immediate medical care if:    · You have a foot sore, an ulcer or break in the skin that is not healing after 4 days, bleeding corns or calluses, or an ingrown toenail.     · You have blue or black areas, which can mean bruising or blood flow problems.     · You have peeling skin or tiny blisters between your toes or cracking or oozing of the skin.     · You have a fever for more than 24 hours and a foot sore.     · You have new numbness or tingling in your feet that does not go away after you move your feet or change positions.     · You have unexplained or unusual swelling of the foot or ankle. Watch closely for changes in your health, and be sure to contact your doctor if:    · You cannot do proper foot care. Where can you learn more?   Go to http://www.gray.com/  Enter A739 in the search box to learn more about \"Diabetes Foot Health: Care Instructions. \"  Current as of: July 28, 2021               Content Version: 13.2  © 2006-2022 [employer]. Care instructions adapted under license by [employer] (which disclaims liability or warranty for this information). If you have questions about a medical condition or this instruction, always ask your healthcare professional. [place] [address] any warranty or liability for your use of this information. Diabetic Neuropathy: Care Instructions  Overview     When you have diabetes, your blood sugar level may get too high. Over time, high blood sugar levels can damage nerves. This is called diabetic neuropathy. Nerve damage can cause pain, burning, tingling, and numbness and may leave you feeling weak. The feet are often affected. When you have nerve damage in your feet, you cannot feel your feet and toes as well as normal and may not notice cuts or sores. Even a small injury can lead to a serious infection. It is very important that you follow your doctor's advice on foot care. Sometimes diabetes damages nerves that help the body function. If this happens, your blood pressure, sweating, digestion, and urination might be affected. Your doctor may give you a target range for your blood sugar that is higher or lower than you are used to. Try to keep your blood sugar very close to this target range to prevent more damage. Follow-up care is a key part of your treatment and safety. Be sure to make and go to all appointments, and call your doctor if you are having problems. It's also a good idea to know your test results and keep a list of the medicines you take. How can you care for yourself at home? · Take your medicines exactly as prescribed. Call your doctor if you think you are having a problem with your medicine.   · Try to keep blood sugar in your target range. ? Follow your meal plan to know how much carbohydrate you need for meals and snacks. A registered dietitian or diabetes educator can help you plan meals. ? Try to get at least 30 minutes of exercise on most days. ? Check your blood sugar as many times each day as your doctor recommends. · Take and record your blood pressure at home if your doctor tells you to. To take your blood pressure at home:  ? Ask your doctor to check your blood pressure monitor to be sure it is accurate and the cuff fits you. Also ask your doctor to watch you to make sure that you are using it right. ? Do not use medicine known to raise blood pressure (such as some nasal decongestant sprays) before taking your blood pressure. ? Avoid taking your blood pressure if you have just exercised or are nervous or upset. Rest at least 15 minutes before you take a reading. · Do not smoke. Smoking can increase your chance for a heart attack or stroke. If you need help quitting, talk to your doctor about stop-smoking programs and medicines. These can increase your chances of quitting for good. · Limit alcohol to 2 drinks a day for men and 1 drink a day for women. Too much alcohol can cause health problems. · Eat small meals often, rather than 2 or 3 large meals a day. To care for your feet  · Prevent injury by wearing shoes at all times, even when you are indoors. · Do foot care as part of your daily routine. Wash your feet and then rub lotion on your feet, but not between your toes. Use a handheld mirror or magnifying mirror to inspect your feet for blisters, cuts, cracks, or sores. · Have your toenails trimmed and filed straight across. · Wear shoes and socks that fit well. Soft shoes that have good support and that fit well (such as tennis shoes) are best for your feet. · Check your shoes for any loose objects or rough edges before you put them on. · Ask your doctor to check your feet during each visit.  Your doctor may notice a foot problem you have missed. · Get early treatment for any foot problem, even a minor one. When should you call for help? Call your doctor now or seek immediate medical care if:    · You have symptoms of infection, such as:  ? Increased pain, swelling, warmth, or redness. ? Red streaks leading from the area. ? Pus draining from the area. ? A fever.     · You have new or worse numbness, pain, or tingling in any part of your body. Watch closely for changes in your health, and be sure to contact your doctor if:    · You have a new problem with your feet, such as:  ? A new sore or ulcer. ? A break in the skin that is not healing after several days. ? Bleeding corns or calluses. ? An ingrown toenail.     · You do not get better as expected. Where can you learn more? Go to http://www.gray.com/  Enter V828 in the search box to learn more about \"Diabetic Neuropathy: Care Instructions. \"  Current as of: July 28, 2021               Content Version: 13.2  © 9807-8048 [employer]. Care instructions adapted under license by [employer] (which disclaims liability or warranty for this information). If you have questions about a medical condition or this instruction, always ask your healthcare professional. [address] any warranty or liability for your use of this information. Statins: Care Instructions  Overview     Statins are medicines that lower your cholesterol and your risk for a heart attack and stroke. Cholesterol is a type of fat in your blood. If you have too much cholesterol, it can build up in blood vessels. This raises your risk of coronary artery disease, heart attack, and stroke. Statins lower cholesterol by blocking how much your body makes. This prevents cholesterol from building up in your blood vessels. This is called hardening of the arteries.  It is the starting point for some heart and blood flow problems, such as coronary artery disease. Statins may also reduce inflammation around the buildup (called plaque). This can lower the risk that the plaque will break apart and lead to a heart attack or stroke. A heart-healthy lifestyle is important for lowering your risk whether you take statins or not. This includes eating healthy foods, being active, staying at a healthy weight, and not smoking. Examples of statins include:  · Atorvastatin (Lipitor). · Pravastatin (Pravachol). · Simvastatin (Zocor). Statins interact with many medicines. So tell your doctor all of the other medicines that you take. These include prescription medicines, over-the-counter medicines, dietary supplements, and herbal products. Take a statin regularly so that it can work well. High cholesterol doesn't make you feel sick. That's why some people may not feel that they need to take their medicine. But it's important to take your statin because it can lower your risk of heart attack and stroke. Talk with your doctor if you have side effects that bother you. Follow-up care is a key part of your treatment and safety. Be sure to make and go to all appointments, and call your doctor if you are having problems. It's also a good idea to know your test results and keep a list of the medicines you take. How can you care for yourself at home? · Take statins exactly as your doctor tells you. High cholesterol has no symptoms. So it is easy to forget to take the pills. Try to make a system that reminds you to take them. · Check with your doctor or pharmacist before you use any other medicines, including over-the-counter medicines. Make sure your doctor knows all of the medicines, vitamins, herbal products, and supplements you take. Taking some medicines together can cause problems. · Call your doctor if you have side effects that bother you. There may be different statins you can try.  Work with your doctor to find the right statin and amount for you. · Have a heart-healthy lifestyle. Eat heart-healthy foods, be active, don't smoke, and stay at a healthy weight. · Talk to your doctor about avoiding grapefruit juice if you take statins. Grapefruit juice can raise the level of this medicine in your blood. This could increase side effects. When should you call for help? Watch closely for changes in your health, and be sure to contact your doctor if:    · You think you are having problems with your medicine.     · You have aches or muscle pain. Where can you learn more? Go to http://www.gray.com/  Enter R358 in the search box to learn more about \"Statins: Care Instructions. \"  Current as of: January 10, 2022               Content Version: 13.2  © 2006-2022 [employer]. Care instructions adapted under license by [employer] (which disclaims liability or warranty for this information). If you have questions about a medical condition or this instruction, always ask your healthcare professional. [address] any warranty or liability for your use of this information.

## 2022-03-17 DIAGNOSIS — Z79.4 TYPE 2 DIABETES MELLITUS WITH DIABETIC POLYNEUROPATHY, WITH LONG-TERM CURRENT USE OF INSULIN (HCC): Primary | ICD-10-CM

## 2022-03-17 DIAGNOSIS — E78.5 DYSLIPIDEMIA: ICD-10-CM

## 2022-03-17 DIAGNOSIS — R76.8 HCV ANTIBODY POSITIVE: ICD-10-CM

## 2022-03-17 DIAGNOSIS — E11.42 TYPE 2 DIABETES MELLITUS WITH DIABETIC POLYNEUROPATHY, WITH LONG-TERM CURRENT USE OF INSULIN (HCC): Primary | ICD-10-CM

## 2022-03-17 LAB
ALBUMIN SERPL-MCNC: 3.4 G/DL (ref 3.5–5)
ALBUMIN/GLOB SERPL: 0.7 {RATIO} (ref 1.1–2.2)
ALP SERPL-CCNC: 113 U/L (ref 45–117)
ALT SERPL-CCNC: 40 U/L (ref 12–78)
ANION GAP SERPL CALC-SCNC: 7 MMOL/L (ref 5–15)
AST SERPL-CCNC: 18 U/L (ref 15–37)
BASOPHILS # BLD: 0.1 K/UL (ref 0–0.1)
BASOPHILS NFR BLD: 1 % (ref 0–1)
BILIRUB SERPL-MCNC: 0.2 MG/DL (ref 0.2–1)
BUN SERPL-MCNC: 13 MG/DL (ref 6–20)
BUN/CREAT SERPL: 13 (ref 12–20)
CALCIUM SERPL-MCNC: 10.1 MG/DL (ref 8.5–10.1)
CHLORIDE SERPL-SCNC: 93 MMOL/L (ref 97–108)
CHOLEST SERPL-MCNC: 173 MG/DL
CO2 SERPL-SCNC: 27 MMOL/L (ref 21–32)
CREAT SERPL-MCNC: 1.03 MG/DL (ref 0.7–1.3)
DIFFERENTIAL METHOD BLD: ABNORMAL
EOSINOPHIL # BLD: 0.5 K/UL (ref 0–0.4)
EOSINOPHIL NFR BLD: 4 % (ref 0–7)
ERYTHROCYTE [DISTWIDTH] IN BLOOD BY AUTOMATED COUNT: 13.8 % (ref 11.5–14.5)
EST. AVERAGE GLUCOSE BLD GHB EST-MCNC: 303 MG/DL
GLOBULIN SER CALC-MCNC: 4.7 G/DL (ref 2–4)
GLUCOSE SERPL-MCNC: 573 MG/DL (ref 65–100)
HBA1C MFR BLD: 12.2 % (ref 4–5.6)
HCT VFR BLD AUTO: 46.4 % (ref 36.6–50.3)
HCV AB SER IA-ACNC: >11 INDEX
HCV AB SERPL QL IA: REACTIVE
HDLC SERPL-MCNC: 28 MG/DL
HDLC SERPL: 6.2 {RATIO} (ref 0–5)
HGB BLD-MCNC: 14.5 G/DL (ref 12.1–17)
HIV 1+2 AB+HIV1 P24 AG SERPL QL IA: NONREACTIVE
HIV12 RESULT COMMENT, HHIVC: NORMAL
IMM GRANULOCYTES # BLD AUTO: 0 K/UL (ref 0–0.04)
IMM GRANULOCYTES NFR BLD AUTO: 0 % (ref 0–0.5)
LDLC SERPL CALC-MCNC: 74.6 MG/DL (ref 0–100)
LYMPHOCYTES # BLD: 2.8 K/UL (ref 0.8–3.5)
LYMPHOCYTES NFR BLD: 26 % (ref 12–49)
MCH RBC QN AUTO: 27.9 PG (ref 26–34)
MCHC RBC AUTO-ENTMCNC: 31.3 G/DL (ref 30–36.5)
MCV RBC AUTO: 89.4 FL (ref 80–99)
MONOCYTES # BLD: 0.6 K/UL (ref 0–1)
MONOCYTES NFR BLD: 6 % (ref 5–13)
NEUTS SEG # BLD: 6.6 K/UL (ref 1.8–8)
NEUTS SEG NFR BLD: 63 % (ref 32–75)
NRBC # BLD: 0 K/UL (ref 0–0.01)
NRBC BLD-RTO: 0 PER 100 WBC
PLATELET # BLD AUTO: 358 K/UL (ref 150–400)
PMV BLD AUTO: 10.2 FL (ref 8.9–12.9)
POTASSIUM SERPL-SCNC: 4.5 MMOL/L (ref 3.5–5.1)
PROT SERPL-MCNC: 8.1 G/DL (ref 6.4–8.2)
RBC # BLD AUTO: 5.19 M/UL (ref 4.1–5.7)
SODIUM SERPL-SCNC: 127 MMOL/L (ref 136–145)
TRIGL SERPL-MCNC: 352 MG/DL (ref ?–150)
VLDLC SERPL CALC-MCNC: 70.4 MG/DL
WBC # BLD AUTO: 10.6 K/UL (ref 4.1–11.1)

## 2022-03-17 PROCEDURE — 3046F HEMOGLOBIN A1C LEVEL >9.0%: CPT | Performed by: INTERNAL MEDICINE

## 2022-03-17 RX ORDER — ATORVASTATIN CALCIUM 20 MG/1
20 TABLET, FILM COATED ORAL DAILY
Qty: 30 TABLET | Refills: 11 | Status: SHIPPED | OUTPATIENT
Start: 2022-03-17 | End: 2022-04-04 | Stop reason: DRUGHIGH

## 2022-03-17 NOTE — PROGRESS NOTES
This patient had numerous abnormal labs. His serum glucose was extremely high and his A1C showed extremely poor control of diabetes. He needs additional insulin, which we had added at his last appointment. I am giving him a referral to Endocrinology to assist with management of this. He had a positive HCV antibody panel. I am ordering an additional HCV quantitative PCR to further assess this. His hyponatremia (low sodium) is likely because of his hyperglycemia. He has hypertriglyceridemia. This is likely in part because of his insulin deficiency. He needs to be on a statin. This is ordered for him as well; he can start this once he feels he is tolerating the Duloxetine (Cymbalta) we had ordered for his neuropathy yesterday.

## 2022-03-24 PROBLEM — R76.8 HCV ANTIBODY POSITIVE: Status: ACTIVE | Noted: 2022-03-17

## 2022-03-24 RX ORDER — PEN NEEDLE, DIABETIC 31 GX3/16"
NEEDLE, DISPOSABLE MISCELLANEOUS
Qty: 100 PEN NEEDLE | Refills: 5 | Status: SHIPPED | OUTPATIENT
Start: 2022-03-24 | End: 2022-10-21 | Stop reason: SDUPTHER

## 2022-04-12 ENCOUNTER — TELEPHONE (OUTPATIENT)
Dept: INTERNAL MEDICINE CLINIC | Age: 42
End: 2022-04-12

## 2022-04-12 NOTE — TELEPHONE ENCOUNTER
A referral for a vascular surgeon is not necessary at this time unless the patient is having new symptoms consistent with vascular insufficiency such as muscle cramps with ambulation or other symptoms consistent with claudication. His neuropathy is almost certainly related to his diabetes mellitus and a referral for neurology is probably unnecessary at this point. The referral to podiatry is to have the patient checked for evidence of calluses and other potential risks for complications from his diabetes. Routine diabetic foot care requires a podiatrist, and the patient should be advised to maintain this appointment as previously referred. No additional referral was placed at this point unless the patient is having additional symptoms.

## 2022-04-12 NOTE — TELEPHONE ENCOUNTER
Received a voicemail stating that the referral for the podiatrist needed to be adjusted to a vascular or nerve doctor versus a regular podiatrist due to the patients neuropathy. Please adjust and advise patient.     [phone]

## 2022-04-26 ENCOUNTER — OFFICE VISIT (OUTPATIENT)
Dept: INTERNAL MEDICINE CLINIC | Age: 42
End: 2022-04-26
Payer: COMMERCIAL

## 2022-04-26 VITALS
SYSTOLIC BLOOD PRESSURE: 141 MMHG | WEIGHT: 137.7 LBS | DIASTOLIC BLOOD PRESSURE: 95 MMHG | OXYGEN SATURATION: 97 % | BODY MASS INDEX: 21.61 KG/M2 | HEART RATE: 100 BPM | HEIGHT: 67 IN

## 2022-04-26 DIAGNOSIS — E11.42 TYPE 2 DIABETES MELLITUS WITH DIABETIC POLYNEUROPATHY, WITH LONG-TERM CURRENT USE OF INSULIN (HCC): ICD-10-CM

## 2022-04-26 DIAGNOSIS — R76.8 HCV ANTIBODY POSITIVE: ICD-10-CM

## 2022-04-26 DIAGNOSIS — Z79.4 TYPE 2 DIABETES MELLITUS WITH DIABETIC POLYNEUROPATHY, WITH LONG-TERM CURRENT USE OF INSULIN (HCC): ICD-10-CM

## 2022-04-26 DIAGNOSIS — F11.20 HEROIN ADDICTION (HCC): ICD-10-CM

## 2022-04-26 DIAGNOSIS — Z00.00 ROUTINE ADULT HEALTH MAINTENANCE: Primary | ICD-10-CM

## 2022-04-26 DIAGNOSIS — E78.5 DYSLIPIDEMIA: ICD-10-CM

## 2022-04-26 DIAGNOSIS — F17.200 CURRENT SMOKER: ICD-10-CM

## 2022-04-26 DIAGNOSIS — Z23 ENCOUNTER FOR IMMUNIZATION: ICD-10-CM

## 2022-04-26 DIAGNOSIS — I10 HYPERTENSION, UNSPECIFIED TYPE: ICD-10-CM

## 2022-04-26 DIAGNOSIS — E11.49 OTHER DIABETIC NEUROLOGICAL COMPLICATION ASSOCIATED WITH TYPE 2 DIABETES MELLITUS (HCC): ICD-10-CM

## 2022-04-26 PROCEDURE — 99214 OFFICE O/P EST MOD 30 MIN: CPT | Performed by: INTERNAL MEDICINE

## 2022-04-26 PROCEDURE — 3046F HEMOGLOBIN A1C LEVEL >9.0%: CPT | Performed by: INTERNAL MEDICINE

## 2022-04-26 RX ORDER — ATORVASTATIN CALCIUM 40 MG/1
TABLET, FILM COATED ORAL
Qty: 30 TABLET | Refills: 11 | Status: SHIPPED | OUTPATIENT
Start: 2022-04-26 | End: 2022-10-21

## 2022-04-26 RX ORDER — METFORMIN HYDROCHLORIDE 500 MG/1
500 TABLET ORAL 2 TIMES DAILY WITH MEALS
Qty: 60 TABLET | Refills: 11 | Status: SHIPPED | OUTPATIENT
Start: 2022-04-26 | End: 2022-05-26 | Stop reason: DRUGHIGH

## 2022-04-26 RX ORDER — DULOXETIN HYDROCHLORIDE 60 MG/1
60 CAPSULE, DELAYED RELEASE ORAL DAILY
Qty: 30 CAPSULE | Refills: 11 | Status: SHIPPED | OUTPATIENT
Start: 2022-04-26 | End: 2022-10-21

## 2022-04-26 NOTE — PATIENT INSTRUCTIONS
1. Your new dose of Lantus is 36 units subcutaneous injection at night. A. Measure your blood sugar prior to each meal (breakfast, lunch, dinner, and again at bedtime). B. Titrate your Lantus dose according to your fasting, pre-breakfast blood sugar reading.     i. Your goal fasting blood sugar reading is between 80 to 120 mg/dL. ii. If your fasting blood sugar is GREATER than 120 mg/dL for 3 CONSECUTIVE DAYS, increase your evening dose of lantus by 2 units per dose.                iii. For example, if your current dose is 36 units subcutaneous injection at night, increase your dosing to 38 units at night. iv. Continue increasing your dose of Lantus in this fashion every 3 days until you have a morning blood sugar reading of less than 120 mg/dL. v. If your fasting blood sugar reading is less than 80 mg/dL for 3 consecutive days, decrease your evening dose by 2 units per dose. vi. If you have a low blood sugar reading of < 70 mg/dL at any point, do not increase your Lantus dosing any further, but decrease the dose as per above, and notify your treatment team.                                    If for 3 consecutive days your blood sugar is:        < 80 mg/dL   between  mg/dL  > 120 mg/dL               Decrease the dose by 2 units Keep the same Lantus dose Increase the dose by 2 units                                               2. Short-acting insulin (novolog) dosing instructions:        A. Take 8 units of fast-acting insulin (Novolog) with each meal (breakfast, lunch, and dinner).                B. In addition to this, please add on additional insulin according to your blood sugar readings based upon the following scale:                         Blood Glucose Reading    Response                 For Blood Sugar Readings Less than 75 mg/dL:  Drink Juice, eat something, or call for medical attention              For Blood Sugar Readings between  mg/dL: Do Nothing      For Blood Sugar Readings between 150-230 mg/dL: Inject 2 units     For Blood Sugar Readings between 230-310 mg/dL: Inject 4 units     For Blood Sugar Readings between 310-390 mg/dL: Inject 6 units     For Blood Sugar Readings between 390-470 mg/dL: Inject 8 Units     For Blood Sugar Readings between 470-550 mg/dL: Inject 10 Units     For Blood Sugar Readings > than 550mg/dL:  Inject 12 Units, call for medical attention              Continue taking:          Metformin 500 mg by mouth twice daily with meals                        Metformin (By mouth)   Metformin Hydrochloride (met-FOR-min christiano-droe-KLOR-mara)  Treats type 2 diabetes. Brand Name(s): DM2, Fortamet, Glucophage, Glucophage XR, Glumetza, Riomet   There may be other brand names for this medicine. When This Medicine Should Not Be Used: This medicine is not right for everyone. Do not use if you had an allergic reaction to metformin. How to Use This Medicine:   Liquid, Tablet, Long Acting Tablet  · Take your medicine as directed. Your dose may need to be changed several times to find what works best for you. · It is best to take this medicine with food or milk. · Swallow the extended-release tablet whole. Do not crush, break, or chew it. Tell your doctor if you have trouble swallowing the tablets whole. · Measure the oral liquid medicine with a marked measuring spoon, oral syringe, or medicine cup. · Read and follow the patient instructions that come with this medicine. Talk to your doctor or pharmacist if you have any questions. · Missed dose: Take a dose as soon as you remember. If it is almost time for your next dose, wait until then and take a regular dose. Do not take extra medicine to make up for a missed dose. · Store the medicine in a closed container at room temperature, away from heat, moisture, and direct light.   Drugs and Foods to Avoid:   Ask your doctor or pharmacist before using any other medicine, including over-the-counter medicines, vitamins, and herbal products. · Some medicines can affect how metformin works. Tell your doctor if you are using any of the following:  ¨ Acetazolamide, dichlorphenamide, dolutegravir, isoniazid, nicotinic acid, phenytoin, ranolazine, topiramate, vandetanib, zonisamide  ¨ Birth control pills  ¨ Blood pressure medicine  ¨ Diuretic (water pill)  ¨ Phenothiazine medicine  ¨ Steroid medicine  ¨ Thyroid medicine  · Do not drink alcohol while you are using this medicine. Warnings While Using This Medicine:   · Tell your doctor if you are pregnant or breastfeeding, or if you have kidney disease, liver disease, heart or blood vessel disease, heart failure, blood circulation problems, anemia, metabolic acidosis, an adrenal gland or pituitary gland disorder, vitamin B12 deficiency, or had a heart attack. Tell your doctor if you drink alcohol. · Too much of this medicine can cause a rare, but serious condition called lactic acidosis. · Part of the extended-release tablet may pass in your stool. This is normal.  · Make sure any doctor or dentist who treats you knows that you are using this medicine. You may need to stop using this medicine before you have surgery, an x-ray, CT scan, or other medical test.  · Your doctor will do lab tests at regular visits to check on the effects of this medicine. Keep all appointments. · Keep all medicine out of the reach of children. Never share your medicine with anyone.   Possible Side Effects While Using This Medicine:   Call your doctor right away if you notice any of these side effects:  · Allergic reaction: Itching or hives, swelling in your face or hands, swelling or tingling in your mouth or throat, chest tightness, trouble breathing  · Confusion, fast heartbeat, increased hunger, shakiness  · Fever or chills  · Stomach pain, nausea, vomiting, muscle pain or cramping  · Trouble breathing, slow heartbeat, lightheadedness, dizziness  · Unusual tiredness or weakness  If you notice these less serious side effects, talk with your doctor:   · Diarrhea, gas  If you notice other side effects that you think are caused by this medicine, tell your doctor. Call your doctor for medical advice about side effects. You may report side effects to FDA at 8-825-NQW-4090  © 2017 [facility] Information is for End User's use only and may not be sold, redistributed or otherwise used for commercial purposes. The above information is an  only. It is not intended as medical advice for individual conditions or treatments. Talk to your doctor, nurse or pharmacist before following any medical regimen to see if it is safe and effective for you. Statins: Care Instructions  Overview     Statins are medicines that lower your cholesterol and your risk for a heart attack and stroke. Cholesterol is a type of fat in your blood. If you have too much cholesterol, it can build up in blood vessels. This raises your risk of coronary artery disease, heart attack, and stroke. Statins lower cholesterol by blocking how much your body makes. This prevents cholesterol from building up in your blood vessels. This is called hardening of the arteries. It is the starting point for some heart and blood flow problems, such as coronary artery disease. Statins may also reduce inflammation around the buildup (called plaque). This can lower the risk that the plaque will break apart and lead to a heart attack or stroke. A heart-healthy lifestyle is important for lowering your risk whether you take statins or not. This includes eating healthy foods, being active, staying at a healthy weight, and not smoking. Examples of statins include:  · Atorvastatin (Lipitor). · Pravastatin (Pravachol). · Simvastatin (Zocor). Statins interact with many medicines. So tell your doctor all of the other medicines that you take.  These include prescription medicines, over-the-counter medicines, dietary supplements, and herbal products. Take a statin regularly so that it can work well. High cholesterol doesn't make you feel sick. That's why some people may not feel that they need to take their medicine. But it's important to take your statin because it can lower your risk of heart attack and stroke. Talk with your doctor if you have side effects that bother you. Follow-up care is a key part of your treatment and safety. Be sure to make and go to all appointments, and call your doctor if you are having problems. It's also a good idea to know your test results and keep a list of the medicines you take. How can you care for yourself at home? · Take statins exactly as your doctor tells you. High cholesterol has no symptoms. So it is easy to forget to take the pills. Try to make a system that reminds you to take them. · Check with your doctor or pharmacist before you use any other medicines, including over-the-counter medicines. Make sure your doctor knows all of the medicines, vitamins, herbal products, and supplements you take. Taking some medicines together can cause problems. · Call your doctor if you have side effects that bother you. There may be different statins you can try. Work with your doctor to find the right statin and amount for you. · Have a heart-healthy lifestyle. Eat heart-healthy foods, be active, don't smoke, and stay at a healthy weight. · Talk to your doctor about avoiding grapefruit juice if you take statins. Grapefruit juice can raise the level of this medicine in your blood. This could increase side effects. When should you call for help? Watch closely for changes in your health, and be sure to contact your doctor if:    · You think you are having problems with your medicine.     · You have aches or muscle pain. Where can you learn more?   Go to http://www.gray.com/  Enter R358 in the search box to learn more about \"Statins: Care Instructions. \"  Current as of: January 10, 2022               Content Version: 13.2  © 2006-2022 [employer]. Care instructions adapted under license by [employer] (which disclaims liability or warranty for this information). If you have questions about a medical condition or this instruction, always ask your healthcare professional. [address] any warranty or liability for your use of this information. Atorvastatin (By mouth)   Atorvastatin (a-tor-va-STAT-in)  Treats high cholesterol and triglyceride levels. Reduces the risk of angina, stroke, heart attack, or certain heart and blood vessel problems. This medicine is a statin. Brand Name(s): Lipitor   There may be other brand names for this medicine. When This Medicine Should Not Be Used: This medicine is not right for everyone. Do not use it if you had an allergic reaction to atorvastatin, if you have active liver disease, or if you are pregnant or breastfeeding. How to Use This Medicine:   Tablet  · Take your medicine as directed. Your dose may need to be changed several times to find what works best for you. · Take this medicine at the same time each day. · Swallow the tablet whole. Do not break it. · Missed dose: Take a dose as soon as you remember. If it is less than 12 hours until your next dose, skip the missed dose and take the next dose at the regular time. Do not take 2 doses of this medicine within 12 hours. · Read and follow the patient instructions that come with this medicine. Talk to your doctor or pharmacist if you have any questions. · Store the medicine in a closed container at room temperature, away from heat, moisture, and direct light. Drugs and Foods to Avoid:   Ask your doctor or pharmacist before using any other medicine, including over-the-counter medicines, vitamins, and herbal products. · Some medicines can affect how atorvastatin works.  Tell your doctor if you also use birth control pills, boceprevir, cimetidine, colchicine, cyclosporine, digoxin, niacin, rifampin, spironolactone, telaprevir, medicine to treat an infection, or medicine to treat HIV/AIDS. Warnings While Using This Medicine:   · It is not safe to take this medicine during pregnancy. It could harm an unborn baby. Tell your doctor right away if you become pregnant. · Tell your doctor if you have kidney disease, diabetes, muscle pain or weakness, thyroid problems, have recently had a stroke or TIA (transient ischemic attack), or have a history of liver disease. Tell your doctor if you drink grapefruit juice or drink alcohol regularly. · This medicine can cause muscle problems, which can lead to kidney problems. · Tell any doctor or dentist who treats you that you use this medicine. You may need to stop using it if you have surgery, have an injury, or develop serious health problems. · Your doctor will do lab tests at regular visits to check on the effects of this medicine. Keep all appointments. · Keep all medicine out of the reach of children. Never share your medicine with anyone. Possible Side Effects While Using This Medicine:   Call your doctor right away if you notice any of these side effects:  · Allergic reaction: Itching or hives, swelling in your face or hands, swelling or tingling in your mouth or throat, chest tightness, trouble breathing  · Blistering, peeling, red skin rash  · Change in how much or how often you urinate  · Dark urine or pale stools, nausea, vomiting, loss of appetite, stomach pain, yellow skin or eyes  · Fever  · Muscle pain, tenderness, or weakness  · Unusual tiredness  If you notice these less serious side effects, talk with your doctor:   · Diarrhea  · Joint pain  If you notice other side effects that you think are caused by this medicine, tell your doctor. Call your doctor for medical advice about side effects.  You may report side effects to FDA at 1-800-FDA-1088  © 2017 [facility] Information is for End User's use only and may not be sold, redistributed or otherwise used for commercial purposes. The above information is an  only. It is not intended as medical advice for individual conditions or treatments. Talk to your doctor, nurse or pharmacist before following any medical regimen to see if it is safe and effective for you. Diabetic Retinopathy: Care Instructions  Overview     Diabetes can damage the small blood vessels in part of your eye. This part of the eye is called the retina. It detects light that enters the eye. Then it sends signals to your brain about what the eye sees. When this type of eye damage happens, it's called diabetic retinopathy. It can lead to poor vision and even blindness. But if you keep your blood sugar and blood pressure levels in your target range, you can help avoid or slow the damage. Follow-up care is a key part of your treatment and safety. Be sure to make and go to all appointments, and call your doctor if you are having problems. It's also a good idea to know your test results and keep a list of the medicines you take. How can you care for yourself at home? · Have regular eye exams. Tell your doctor about any changes in your vision. · Keep blood sugar in your target range. ? Eat a variety of healthy foods, and follow your meal plan so you know how much carbohydrate you need for meals and snacks. ? It's important to stay as active as you can. Walking is a good choice. Bit by bit, increase the amount you walk every day. Try for at least 30 minutes on most days of the week. ? Be safe with medicines. Take your medicine exactly as prescribed. Call your doctor if you think you are having a problem with your medicine. ? Check your blood sugar as often as your doctor recommends. · Eat a low-salt diet. If you have high blood pressure, this may help lower it.  You may also need to take medicines to reach your goals. · Do not smoke. If you need help quitting, talk to your doctor about stop-smoking programs and medicines. These can increase your chances of quitting for good. · Avoid risky activities. These include things like weight lifting and some contact sports. They may trigger bleeding in the eye through impact or increased pressure. · Talk to your doctor if you are pregnant or planning to get pregnant. Retinopathy can get much worse during pregnancy. Planning ahead with your doctor and following the doctor's instructions can decrease this risk. When should you call for help? Call your doctor now or seek immediate medical care if:    · You have sudden vision changes. Watch closely for changes in your health, and be sure to contact your doctor if:    · You have increasing trouble doing everyday tasks like driving or reading because of your eyesight. Where can you learn more? Go to http://www.gray.com/  Enter R396 in the search box to learn more about \"Diabetic Retinopathy: Care Instructions. \"  Current as of: July 28, 2021               Content Version: 13.2  © 2006-2022 [employer]. Care instructions adapted under license by [employer] (which disclaims liability or warranty for this information). If you have questions about a medical condition or this instruction, always ask your healthcare professional. [address] any warranty or liability for your use of this information. Learning About Benefits From Quitting Smoking  How does quitting smoking make you healthier? If you're thinking about quitting smoking, you may have a few reasons to be smoke-free. Your health may be one of them. · When you quit smoking, you lower your risks for cancer, lung disease, heart attack, stroke, blood vessel disease, and blindness from macular degeneration.   · When you're smoke-free, you get sick less often, and you heal faster. You are less likely to get colds, flu, bronchitis, and pneumonia. · As a nonsmoker, you may find that your mood is better and you are less stressed. When and how will you feel healthier? Quitting has real health benefits that start from day 1 of being smoke-free. And the longer you stay smoke-free, the healthier you get and the better you feel. The first hours  · After just 20 minutes, your blood pressure and heart rate go down. That means there's less stress on your heart and blood vessels. · Within 12 hours, the level of carbon monoxide in your blood drops back to normal. That makes room for more oxygen. With more oxygen in your body, you may notice that you have more energy than when you smoked. After 2 weeks  · Your lungs start to work better. · Your risk of heart attack starts to drop. After 1 month  · When your lungs are clear, you cough less and breathe deeper, so it's easier to be active. · Your sense of taste and smell return. That means you can enjoy food more than you have since you started smoking. Over the years  · Over the years, your risks of heart disease, heart attack, and stroke are lower. · After 10 years, your risk of dying from lung cancer is cut by about half. And your risk for many other types of cancer is lower too. How would quitting help others in your life? When you quit smoking, you improve the health of everyone who now breathes in your smoke. · Their heart, lung, and cancer risks drop, much like yours. · They are sick less. For babies and small children, living smoke-free means they're less likely to have ear infections, pneumonia, and bronchitis. · If you're a woman who is or will be pregnant someday, quitting smoking means a healthier . · Children who are close to you are less likely to become adult smokers. Where can you learn more?   Go to http://www.gray.com/  Enter O319 in the search box to learn more about \"Learning About Benefits From Quitting Smoking. \"  Current as of: October 28, 2021               Content Version: 13.2  © 8062-4577 [employer]wise, Inc[employer]. Care instructions adapted under license by [employer] (which disclaims liability or warranty for this information). If you have questions about a medical condition or this instruction, always ask your healthcare professional. [place] [address] any warranty or liability for your use of this information.

## 2022-04-26 NOTE — PROGRESS NOTES
Chief Complaint   Patient presents with    Follow-up           1. \"Have you been to the ER, urgent care clinic since your last visit? Hospitalized since your last visit? \" No    2. \"Have you seen or consulted any other health care providers outside of the [address] [place] [address] since your last visit? \" No     3. For patients aged 39-70: Has the patient had a colonoscopy / FIT/ Cologuard? No      If the patient is female:    4. For patients aged 41-77: Has the patient had a mammogram within the past 2 years? No      5. For patients aged 21-65: Has the patient had a pap smear?  No

## 2022-04-26 NOTE — PROGRESS NOTES
Subjective:     Chief Complaint   Patient presents with    Follow-up       Bala Ward is a 39 y.o. M. He has a history of poorly controlled diabetes mellitus, as with a positive HCV antibody test.  I had seen him most recently in mid March for. He was noted to be using heroin actively, and smoking at the time. He was using glargine 20 units at night along with 20 units of NovoLog 3 times daily with meals. Despite this, he was still having polyuria and polydipsia. His physical exam and notable for hypertension to 131/92 mmHg. I had continued him with NovoLog 20 units 3 times daily, and provided him with a sliding scale; and I increased his Lantus to 30 units at night, with the plan to increase this over time with a titration scale. I had also referred him to endocrinology for additional assistance with therapy. Cymbalta was started for his peripheral neuropathy. Laboratory studies confirmed poorly diabetes and a positive HCV antibody. Today, the patient comes in to discuss his diabetes and peripheral neuropathy. He says that the current dosing of Cymbalta 30 mg/d is insufficient and he continues to have severe burning in his feet and hands, as well as numbness and tingling. That said, his blood sugar readings have been better. Instead of routinely getting blood sugars in the 400 mg/dL range he now has readings typically in the high 200-250 mg/dL range instead without hypoglycemic episodes. He reports using 30 units of Lantus and typically 6-10 units of Novolog with meals. He has not been able to establish with podiatry as of yet. He continues to have significant callus formation on his feet. He also continues to smoke one pack of cigarettes daily and is precontemplative regarding cessation. He refuses TDAP and Prevnar vaccination today. His ROS is otherwise negative. 10-year Cardiovascular Risk Rosa Guillermo, 2013):   The 10-year ASCVD risk score (Tammy Charles., et al., 2013) is: 14.8%    Values used to calculate the score:      Age: 39 years      Sex: Male      Is Non- : No      Diabetic: Yes      Tobacco smoker: Yes      Systolic Blood Pressure: 886 mmHg      Is BP treated: No      HDL Cholesterol: 28 MG/DL      Total Cholesterol: 173 MG/DL       Past Medical History:  Past Medical History:   Diagnosis Date    Current smoker     Diabetes mellitus ([facility]     RX = insulin (historical, per patient)    Diabetic neuropathy (HCC)     RX = Cymbalta    Dyslipidemia     Dyslipidemia     HCV antibody positive     Heroin addiction ([facility]     Hypertension     BP > target < 130/80 mmHg    Methadone maintenance therapy patient [facility])        Past Surgical Histor:  History reviewed. No pertinent surgical history. Allergies:  No Known Allergies    Medications:  Current Outpatient Medications   Medication Sig Dispense Refill    atorvastatin (LIPITOR) 40 mg tablet Take 0.5 Tablets by mouth daily for 14 days, THEN 1 Tablet daily for 346 days. Indications: high cholesterol and high triglycerides, treatment to prevent a heart attack, stroke prevention 30 Tablet 11    metFORMIN (GLUCOPHAGE) 500 mg tablet Take 1 Tablet by mouth two (2) times daily (with meals) for 360 days. 60 Tablet 11    DULoxetine (CYMBALTA) 60 mg capsule Take 1 Capsule by mouth daily for 360 days.  Indications: chronic muscle or bone pain, diabetic complication causing injury to some body nerves, neuropathic pain 30 Capsule 11    Insulin Needles, Disposable, 32 gauge x 5/32\" ndle Use with insulin pens to administer insulin subcutaneous injections 3 times daily with meals 100 Pen Needle 5    insulin glargine (LANTUS,BASAGLAR) 100 unit/mL (3 mL) inpn Inject 30 units subcutaneous injection nightly and adjust as per provided scale  Indications: type 2 diabetes mellitus 3 Adjustable Dose Pre-filled Pen Syringe 5    insulin aspart U-100 (NOVOLOG) 100 unit/mL (3 mL) inpn Inject 20 units subcutaneous injection with meals 3 times daily and adjust as per provided scale  Indications: type 2 diabetes mellitus 3 Adjustable Dose Pre-filled Pen Syringe 5    METHADONE HCL (METHADONE PO) Take 65 mg by mouth.  oxycodone-acetaminophen (PERCOCET) 5-325 mg per tablet Take 1 Tab by mouth every four (4) hours as needed for Pain. 15 Tab 0       Social History:  Social History     Socioeconomic History    Marital status: SINGLE   Tobacco Use    Smoking status: Current Every Day Smoker    Smokeless tobacco: Never Used   Substance and Sexual Activity    Alcohol use: Yes       Family History:  History reviewed. No pertinent family history. Immunizations: There is no immunization history on file for this patient. Healthcare Maintenance:  Health Maintenance   Topic Date Due    Depression Screen  Never done    COVID-19 Vaccine (1) Never done    Pneumococcal 0-64 years (1 - PCV) Never done    Foot Exam Q1  Never done    MICROALBUMIN Q1  Never done    Eye Exam Retinal or Dilated  Never done    DTaP/Tdap/Td series (1 - Tdap) Never done    A1C test (Diabetic or Prediabetic)  06/16/2022    Flu Vaccine (Season Ended) 09/01/2022    Lipid Screen  03/16/2023    Hepatitis C Screening  Completed        Review of Systems:  ROS:  Review of Systems   Constitutional: Negative. Negative for chills and fever. HENT: Negative. Eyes: Negative. Negative for blurred vision and double vision. Respiratory: Negative. Negative for cough. Cardiovascular: Negative. Gastrointestinal: Negative. Genitourinary: Negative. Musculoskeletal: Positive for joint pain. Negative for back pain and neck pain. Skin: Negative. Neurological: Positive for tingling and sensory change. Negative for focal weakness, seizures, loss of consciousness and weakness. Endo/Heme/Allergies: Negative. Psychiatric/Behavioral: Negative.        ROS otherwise negative      Objective:     Vital Signs:  Visit Vitals  BP (!) 141/95 (BP 1 Location: Right arm, BP Patient Position: Sitting, BP Cuff Size: Adult)   Pulse 100   Ht 5' 7\" (1.702 m)   Wt 137 lb 11.2 oz (62.5 kg)   SpO2 97%   BMI 21.57 kg/m²       BMI:  Body mass index is 21.57 kg/m². Physical Examination:  Physical Exam  Vitals and nursing note reviewed. Constitutional:       General: He is not in acute distress. Appearance: Normal appearance. He is ill-appearing (chronically ill appearing). He is not toxic-appearing. HENT:      Head: Normocephalic and atraumatic. Right Ear: External ear normal.      Left Ear: External ear normal.      Nose: Nose normal.      Mouth/Throat:      Mouth: Mucous membranes are dry. Cardiovascular:      Rate and Rhythm: Regular rhythm. Tachycardia present. Heart sounds: Normal heart sounds. Pulmonary:      Effort: Pulmonary effort is normal.      Breath sounds: Normal breath sounds. Abdominal:      General: Abdomen is flat. Bowel sounds are normal.   Musculoskeletal:      Cervical back: Normal range of motion and neck supple. Right lower leg: No edema. Left lower leg: No edema. Skin:     General: Skin is warm and dry. Neurological:      General: No focal deficit present. Mental Status: He is alert and oriented to person, place, and time. Mental status is at baseline. Sensory: Sensory deficit (chronic LE peripheral neuropathy) present. Coordination: Coordination normal.      Gait: Gait abnormal (wide based, unsteady). Physical exam otherwise negative    Diagnostic Testing:    Laboratory Studies:  Office Visit on 03/16/2022   Component Date Value Ref Range Status    Cholesterol, total 03/16/2022 173  <200 MG/DL Final    Triglyceride 03/16/2022 352* <150 MG/DL Final    Comment: Based on NCEP-ATP III:  Triglycerides <150 mg/dL  is considered normal, 150-199  mg/dL  borderline high,  200-499 mg/dL high and  greater than or equal to 500  mg/dL very high.       HDL Cholesterol 03/16/2022 28  MG/DL Final    Comment: Based on NCEP ATP III, HDL Cholesterol <40 mg/dL is considered low and >60  mg/dL is elevated.  LDL, calculated 03/16/2022 74.6  0 - 100 MG/DL Final    Comment: Based on the NCEP-ATP: LDL-C concentrations are considered  optimal <100 mg/dL,  near optimal/above Normal 100-129 mg/dL Borderline High: 130-159, High: 160-189  mg/dL Very High: Greater than or equal to 190 mg/dL      VLDL, calculated 03/16/2022 70.4  MG/DL Final    CHOL/HDL Ratio 03/16/2022 6.2* 0.0 - 5.0   Final    Sodium 03/16/2022 127* 136 - 145 mmol/L Final    Potassium 03/16/2022 4.5  3.5 - 5.1 mmol/L Final    Chloride 03/16/2022 93* 97 - 108 mmol/L Final    CO2 03/16/2022 27  21 - 32 mmol/L Final    Anion gap 03/16/2022 7  5 - 15 mmol/L Final    Glucose 03/16/2022 573* 65 - 100 mg/dL Final    BUN 03/16/2022 13  6 - 20 MG/DL Final    Creatinine 03/16/2022 1.03  0.70 - 1.30 MG/DL Final    BUN/Creatinine ratio 03/16/2022 13  12 - 20   Final    GFR est AA 03/16/2022 >60  >60 ml/min/1.73m2 Final    GFR est non-AA 03/16/2022 >60  >60 ml/min/1.73m2 Final    Comment: Estimated GFR is calculated using the IDMS-traceable Modification of Diet in  Renal Disease (MDRD) Study equation, reported for both  Americans  (GFRAA) and non- Americans (GFRNA), and normalized to 1.73m2 body  surface area. The physician must decide which value applies to the patient.  Calcium 03/16/2022 10.1  8.5 - 10.1 MG/DL Final    Bilirubin, total 03/16/2022 0.2  0.2 - 1.0 MG/DL Final    ALT (SGPT) 03/16/2022 40  12 - 78 U/L Final    AST (SGOT) 03/16/2022 18  15 - 37 U/L Final    Alk. phosphatase 03/16/2022 113  45 - 117 U/L Final    Protein, total 03/16/2022 8.1  6.4 - 8.2 g/dL Final    Albumin 03/16/2022 3.4* 3.5 - 5.0 g/dL Final    Globulin 03/16/2022 4.7* 2.0 - 4.0 g/dL Final    A-G Ratio 03/16/2022 0.7* 1.1 - 2.2   Final    WBC 03/16/2022 10.6  4.1 - 11.1 K/uL Final    RBC 03/16/2022 5.19  4. 10 - 5.70 M/uL Final    HGB 03/16/2022 14.5  12.1 - 17.0 g/dL Final    HCT 03/16/2022 46.4  36.6 - 50.3 % Final    MCV 03/16/2022 89.4  80.0 - 99.0 FL Final    MCH 03/16/2022 27.9  26.0 - 34.0 PG Final    MCHC 03/16/2022 31.3  30.0 - 36.5 g/dL Final    RDW 03/16/2022 13.8  11.5 - 14.5 % Final    PLATELET 56/49/7072 147  150 - 400 K/uL Final    MPV 03/16/2022 10.2  8.9 - 12.9 FL Final    NRBC 03/16/2022 0.0  0  WBC Final    ABSOLUTE NRBC 03/16/2022 0.00  0.00 - 0.01 K/uL Final    NEUTROPHILS 03/16/2022 63  32 - 75 % Final    LYMPHOCYTES 03/16/2022 26  12 - 49 % Final    MONOCYTES 03/16/2022 6  5 - 13 % Final    EOSINOPHILS 03/16/2022 4  0 - 7 % Final    BASOPHILS 03/16/2022 1  0 - 1 % Final    IMMATURE GRANULOCYTES 03/16/2022 0  0.0 - 0.5 % Final    ABS. NEUTROPHILS 03/16/2022 6.6  1.8 - 8.0 K/UL Final    ABS. LYMPHOCYTES 03/16/2022 2.8  0.8 - 3.5 K/UL Final    ABS. MONOCYTES 03/16/2022 0.6  0.0 - 1.0 K/UL Final    ABS. EOSINOPHILS 03/16/2022 0.5* 0.0 - 0.4 K/UL Final    ABS. BASOPHILS 03/16/2022 0.1  0.0 - 0.1 K/UL Final    ABS. IMM. GRANS. 03/16/2022 0.0  0.00 - 0.04 K/UL Final    DF 03/16/2022 AUTOMATED    Final    Hemoglobin A1c 03/16/2022 12.2* 4.0 - 5.6 % Final    Comment: NEW METHOD PLEASE NOTE NEW REFERENCE RANGE  (NOTE)  HbA1C Interpretive Ranges  <5.7              Normal  5.7 - 6.4         Consider Prediabetes  >6.5              Consider Diabetes      Est. average glucose 03/16/2022 303  mg/dL Final    HIV 1/2 Interpretation 03/16/2022 NONREACTIVE  NONREACTIVE   Final    HIV 1/2 result comment 03/16/2022 SEE NOTE    Final    Comment: While this assay is highly sensitive, a non-reactive/negative result for HIV  antibodies HIV-1 and HIV-2 and p24 antigen, does not exclude the possibility of  exposure to or infection with HIV. HIV antibodies and/or p24 antigen may be  undetectable in some stages of the infection and in some clinical conditions. Test performed by the [employer] HIV Ag/Ab Combo (CHIV), 4th generation  assay. Recommend consulting relevant CDC guidelines for informing test subject  of the result and its interpretation.  Hepatitis C virus Ab 03/16/2022 >11.00  Index Final    Hep C virus Ab Interp. 03/16/2022 REACTIVE* NONREACTIVE   Final    Comment: Recommend equivocal and reactive anti-HCV results be further evaluated by a  supplemental or confirmatory test, such as quantitative  nucleic acid test for  HCV RNA, if clinically indicated. Method used is [facility]     Admission on 03/09/2022, Discharged on 03/09/2022   Component Date Value Ref Range Status    Glucose (POC) 03/09/2022 409* 65 - 117 mg/dL Final    Comment: (NOTE)  The FDA has indicated that no capillary point of care blood glucose  monitoring systems are approved for use in \"critically ill\" patients,  however they have not defined this population. The College of  American Pathologists has recommended that these devices should not  be used in cases such as severe hypotension, dehydration, shock, and  hyperglycemic-hyperosmolar state, amongst others. Venous or arterial  collection is the recommended specimen for testing these patients.       Performed by 03/09/2022 Zbigniew Neumann EDT   Final    WBC 03/09/2022 9.4  4.1 - 11.1 K/uL Final    RBC 03/09/2022 4.76  4.10 - 5.70 M/uL Final    HGB 03/09/2022 13.4  12.1 - 17.0 g/dL Final    HCT 03/09/2022 41.2  36.6 - 50.3 % Final    MCV 03/09/2022 86.6  80.0 - 99.0 FL Final    MCH 03/09/2022 28.2  26.0 - 34.0 PG Final    MCHC 03/09/2022 32.5  30.0 - 36.5 g/dL Final    RDW 03/09/2022 13.1  11.5 - 14.5 % Final    PLATELET 76/13/0861 675  150 - 400 K/uL Final    MPV 03/09/2022 9.8  8.9 - 12.9 FL Final    NRBC 03/09/2022 0.0  0  WBC Final    ABSOLUTE NRBC 03/09/2022 0.00  0.00 - 0.01 K/uL Final    NEUTROPHILS 03/09/2022 52  32 - 75 % Final    LYMPHOCYTES 03/09/2022 34  12 - 49 % Final    MONOCYTES 03/09/2022 8  5 - 13 % Final    EOSINOPHILS 03/09/2022 6  0 - 7 % Final    BASOPHILS 03/09/2022 0  0 - 1 % Final    IMMATURE GRANULOCYTES 03/09/2022 0  0.0 - 0.5 % Final    ABS. NEUTROPHILS 03/09/2022 4.9  1.8 - 8.0 K/UL Final    ABS. LYMPHOCYTES 03/09/2022 3.2  0.8 - 3.5 K/UL Final    ABS. MONOCYTES 03/09/2022 0.8  0.0 - 1.0 K/UL Final    ABS. EOSINOPHILS 03/09/2022 0.5* 0.0 - 0.4 K/UL Final    ABS. BASOPHILS 03/09/2022 0.0  0.0 - 0.1 K/UL Final    ABS. IMM. GRANS. 03/09/2022 0.0  0.00 - 0.04 K/UL Final    DF 03/09/2022 AUTOMATED    Final    Sodium 03/09/2022 134* 136 - 145 mmol/L Final    Potassium 03/09/2022 3.5  3.5 - 5.1 mmol/L Final    Chloride 03/09/2022 100  97 - 108 mmol/L Final    CO2 03/09/2022 26  21 - 32 mmol/L Final    Anion gap 03/09/2022 8  5 - 15 mmol/L Final    Glucose 03/09/2022 428* 65 - 100 mg/dL Final    BUN 03/09/2022 10  6 - 20 MG/DL Final    Creatinine 03/09/2022 1.11  0.70 - 1.30 MG/DL Final    BUN/Creatinine ratio 03/09/2022 9* 12 - 20   Final    GFR est AA 03/09/2022 >60  >60 ml/min/1.73m2 Final    GFR est non-AA 03/09/2022 >60  >60 ml/min/1.73m2 Final    Estimated GFR is calculated using the IDMS-traceable Modification of Diet in Renal Disease (MDRD) Study equation, reported for both  Americans (GFRAA) and non- Americans (GFRNA), and normalized to 1.73m2 body surface area. The physician must decide which value applies to the patient.  Calcium 03/09/2022 9.3  8.5 - 10.1 MG/DL Final    Bilirubin, total 03/09/2022 0.2  0.2 - 1.0 MG/DL Final    ALT (SGPT) 03/09/2022 27  12 - 78 U/L Final    AST (SGOT) 03/09/2022 12* 15 - 37 U/L Final    Alk.  phosphatase 03/09/2022 86  45 - 117 U/L Final    Protein, total 03/09/2022 7.7  6.4 - 8.2 g/dL Final    Albumin 03/09/2022 3.0* 3.5 - 5.0 g/dL Final    Globulin 03/09/2022 4.7* 2.0 - 4.0 g/dL Final    A-G Ratio 03/09/2022 0.6* 1.1 - 2.2   Final         Radiographic Studies:  XR Results (most recent):  Results from Hospital Encounter encounter on 03/09/22    XR FOOT RT MIN 3 V    Narrative  EXAM: XR FOOT RT MIN 3 V    INDICATION: pain. COMPARISON: None. FINDINGS: Three views of the right foot demonstrate no fracture or other acute  osseous or articular abnormality. The soft tissues are within normal limits. Small plantar calcaneal spur  . Mild DJD first metatarsophalangeal.    Impression  No acute abnormality. ANGE Results (most recent):  No results found for this or any previous visit. CT Results (most recent):  No results found for this or any previous visit. DEXA Results (most recent):  No results found for this or any previous visit. MRI Results (most recent):  No results found for this or any previous visit. Assessment/Plan:       ICD-10-CM ICD-9-CM    1. Routine adult health maintenance  Z00.00 V70.0    2. Type 2 diabetes mellitus with diabetic polyneuropathy, with long-term current use of insulin (HCC)  E11.42 250.60 REFERRAL TO OPHTHALMOLOGY    Z79.4 357.2 metFORMIN (GLUCOPHAGE) 500 mg tablet     V58.67 REFERRAL TO PODIATRY   3. HCV antibody positive  R76.8 795.79 HEPATITIS C QT BY PCR WITH REFLEX GENOTYPE   4. Dyslipidemia  E78.5 272.4 atorvastatin (LIPITOR) 40 mg tablet   5. Current smoker  F17.200 305.1    6. Hypertension, unspecified type  I10 401.9    7. Encounter for immunization  Z23 V03.89    8. [facility] ([facility]  F11.20 304.00    9.  Other diabetic neurological complication associated with type 2 diabetes mellitus (HCC)  E11.49 250.60 REFERRAL TO PODIATRY      DULoxetine (CYMBALTA) 60 mg capsule          HCM:  - refusing vaccines  - Eye exam referral as above  - repeat foot exam referral  - advised to obtain repeat labs including microalbumin    DM2:  - better controlled but not yet acceptable  - Increasing Insulin dosing by 10% today  - Adjustment of sliding scale as noted below  - Lantus titration scale as below  - Beginning low dose Metformin 500 mg BID, plan to increase this to 1000 mg BID if tolerated  - Statin therapy, see below  - Consider GLP1a at next visit if still with hyperglycemia, which I anticipate  - microalbumin as per above      1. Your new dose of Lantus is 36 units subcutaneous injection at night. A. Measure your blood sugar prior to each meal (breakfast, lunch, dinner, and again at bedtime). B. Titrate your Lantus dose according to your fasting, pre-breakfast blood sugar reading.     i. Your goal fasting blood sugar reading is between 80 to 120 mg/dL. ii. If your fasting blood sugar is GREATER than 120 mg/dL for 3 CONSECUTIVE DAYS, increase your evening dose of lantus by 2 units per dose.                iii. For example, if your current dose is 36 units subcutaneous injection at night, increase your dosing to 38 units at night. iv. Continue increasing your dose of Lantus in this fashion every 3 days until you have a morning blood sugar reading of less than 120 mg/dL. v. If your fasting blood sugar reading is less than 80 mg/dL for 3 consecutive days, decrease your evening dose by 2 units per dose. vi. If you have a low blood sugar reading of < 70 mg/dL at any point, do not increase your Lantus dosing any further, but decrease the dose as per above, and notify your treatment team.                                    If for 3 consecutive days your blood sugar is:        < 80 mg/dL   between  mg/dL  > 120 mg/dL               Decrease the dose by 2 units Keep the same Lantus dose Increase the dose by 2 units                                               2. Short-acting insulin (novolog) dosing instructions:        A. Take 8 units of fast-acting insulin (Novolog) with each meal (breakfast, lunch, and dinner).                B. In addition to this, please add on additional insulin according to your blood sugar readings based upon the following scale:                         Blood Glucose Reading    Response                 For Blood Sugar Readings Less than 75 mg/dL:  Drink Juice, eat something, or call for medical attention              For Blood Sugar Readings between  mg/dL: Do Nothing      For Blood Sugar Readings between 150-230 mg/dL: Inject 2 units     For Blood Sugar Readings between 230-310 mg/dL: Inject 4 units     For Blood Sugar Readings between 310-390 mg/dL: Inject 6 units     For Blood Sugar Readings between 390-470 mg/dL: Inject 8 Units     For Blood Sugar Readings between 470-550 mg/dL: Inject 10 Units     For Blood Sugar Readings > than 550mg/dL:  Inject 12 Units, call for medical attention              Continue taking:          Metformin 500 mg by mouth twice daily with meals                          HCV antibody positive:  - Checking QT PCR HCV to confirm if active infection    HLP:  - in context of DM2 and high cardiovascular risk  - atorvastatin 20 mg --> 40 mg added to regimen, discussed RBAI with patient; PVUA    Smoker:  - Precontemplative regarding cessation  - refusing [id number] today  - Continue counseling efforts    Heroin addiction:  - as per history, on methadone by outside entity for same  - Continuing counseling re: continued abstinence    Hypertension:  - target BP < 130/80 mmhg  - Not at target today  - Consider ACEI at next visit, checking microalbumin    F/U 1 month    Follow-up and Dispositions    · Return in about 1 month (around 5/26/2022). Federico Edwards MD    Please note that this dictation was completed with [employer], the computer voice recognition software. Quite often unanticipated grammatical, syntax, homophones, and other interpretive errors are inadvertently transcribed by the computer software. Please disregard these errors. Please excuse any errors that have escaped final proofreading.
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
## 2022-05-26 ENCOUNTER — OFFICE VISIT (OUTPATIENT)
Dept: INTERNAL MEDICINE CLINIC | Age: 42
End: 2022-05-26
Payer: COMMERCIAL

## 2022-05-26 VITALS
OXYGEN SATURATION: 97 % | WEIGHT: 138.1 LBS | SYSTOLIC BLOOD PRESSURE: 127 MMHG | DIASTOLIC BLOOD PRESSURE: 93 MMHG | BODY MASS INDEX: 21.67 KG/M2 | TEMPERATURE: 97.9 F | HEIGHT: 67 IN | HEART RATE: 121 BPM

## 2022-05-26 DIAGNOSIS — Z00.00 ROUTINE ADULT HEALTH MAINTENANCE: Primary | ICD-10-CM

## 2022-05-26 DIAGNOSIS — E78.5 DYSLIPIDEMIA: ICD-10-CM

## 2022-05-26 DIAGNOSIS — I10 PRIMARY HYPERTENSION: ICD-10-CM

## 2022-05-26 DIAGNOSIS — Z87.828 HISTORY OF GUNSHOT WOUND: ICD-10-CM

## 2022-05-26 DIAGNOSIS — F17.200 CURRENT SMOKER: ICD-10-CM

## 2022-05-26 DIAGNOSIS — E11.42 TYPE 2 DIABETES MELLITUS WITH DIABETIC POLYNEUROPATHY, WITH LONG-TERM CURRENT USE OF INSULIN (HCC): ICD-10-CM

## 2022-05-26 DIAGNOSIS — Z79.4 TYPE 2 DIABETES MELLITUS WITH DIABETIC POLYNEUROPATHY, WITH LONG-TERM CURRENT USE OF INSULIN (HCC): ICD-10-CM

## 2022-05-26 DIAGNOSIS — F43.10 PTSD (POST-TRAUMATIC STRESS DISORDER): ICD-10-CM

## 2022-05-26 PROCEDURE — 99215 OFFICE O/P EST HI 40 MIN: CPT | Performed by: INTERNAL MEDICINE

## 2022-05-26 RX ORDER — BUSPIRONE HYDROCHLORIDE 10 MG/1
10 TABLET ORAL 3 TIMES DAILY
Qty: 90 TABLET | Refills: 0 | Status: SHIPPED | OUTPATIENT
Start: 2022-05-26 | End: 2022-06-25

## 2022-05-26 RX ORDER — METFORMIN HYDROCHLORIDE 1000 MG/1
1000 TABLET ORAL 2 TIMES DAILY WITH MEALS
Qty: 60 TABLET | Refills: 11 | Status: SHIPPED | OUTPATIENT
Start: 2022-05-26 | End: 2022-10-21

## 2022-05-26 NOTE — PROGRESS NOTES
Chief Complaint   Patient presents with    Follow-up         1. \"Have you been to the ER, urgent care clinic since your last visit? Hospitalized since your last visit? \" No    2. \"Have you seen or consulted any other health care providers outside of the [address] [place] [address] since your last visit? \" No     3. For patients aged 39-70: Has the patient had a colonoscopy / FIT/ Cologuard? No      If the patient is female:    4. For patients aged 41-77: Has the patient had a mammogram within the past 2 years? No      5. For patients aged 21-65: Has the patient had a pap smear?  No

## 2022-05-26 NOTE — PATIENT INSTRUCTIONS
Learning About Thought Reframing  What is thought reframing? Thought reframing is a skill taught in cognitive-behavioral therapy (CBT). It's the process of replacing negative thoughts (which can worsen things like anxiety, depression, and pain) with more helpful thoughts. How can thought reframing help you? Thought reframing helps you become more aware of how your thoughts are connected to your feelings and behaviors. Finding unhelpful thinking patterns and shifting them can make you feel better. That can help you better handle life's challenges. You may be more able to manage stress and to handle depression and anxiety. How can you get started with thought reframing? Here's how to get started with reframing unhelpful thoughts. 1. Notice the thought. Pay attention to thoughts that are discouraging. For example, in a job review, your boss praised several things about your work. But you're feeling down because she had one small criticism. You might even think, \"I'm no good at my job\" or \"She doesn't like me. I must be bad. \" These thoughts can pop up sometimes before you can stop them. But learning to recognize them can help you shift them. 2. Question the thought. Look at that thought, without judging it, and ask yourself whether it is helpful or true. Ask yourself questions about the situation and your thoughts. You might ask, \"What did my boss say exactly? \" \"Were there positive comments? \" \"Why do I focus only on one criticism? \" Your answers can help you find more accurate and helpful ways to think about the situation. 3. Replace the unhelpful thought with a more helpful one. Here's where you can ask yourself \"What's something that's true but more helpful? \" For example, you might think, \"I've done a lot of good work this year, and my boss noticed it. She thought there was one area I can improve. So I'll think of some things I can do to get stronger in that area. \"  Here are some examples of negative thoughts and how they could be replaced with more helpful thoughts. ? Example:  § Original thought: \"I'm sad that I don't have many friends. People must not like me. \"  § Replacement thought: \"I have some friends, so I know I can make more. It might just take some time. \"  ? Example:  § Original thought: \"I should get  before I'm 27. If I don't, I'll probably end up alone. \"  § Replacement thought: \"There's no guarantee that I'll meet the right person by the time I'm 30. If I don't get  by then, I still have time to find a good relationship. \"  Changing your thought patterns may not be easy. But our minds can be trained to be stronger and healthier--just like a muscle. With time and practice, you'll get better at noticing unhelpful thoughts and choosing healthier thoughts instead. Where can you learn more? Go to http://www.gray.com/  Enter [id number] in the search box to learn more about \"Learning About Thought Reframing. \"  Current as of: June 16, 2021               Content Version: 13.2  © 6193-3143 [employer]. Care instructions adapted under license by [employer] (which disclaims liability or warranty for this information). If you have questions about a medical condition or this instruction, always ask your healthcare professional. [place] [address] any warranty or liability for your use of this information. Depression and Chronic Disease: Care Instructions  Your Care Instructions     A chronic disease is one that you have for a long time. Some chronic diseases can be controlled, but they usually cannot be cured. Depression is common in people with chronic diseases, but it often goes unnoticed. Many people have concerns about seeking treatment for a mental health problem. You may think it's a sign of weakness, or you don't want people to know about it. It's important to overcome these reasons for not seeking treatment. Treating depression or anxiety is good for your health. Follow-up care is a key part of your treatment and safety. Be sure to make and go to all appointments, and call your doctor if you are having problems. It's also a good idea to know your test results and keep a list of the medicines you take. How can you care for yourself at home? Watch for symptoms of depression  The symptoms of depression are often subtle at first. You may think they are caused by your disease rather than depression. Or you may think it is normal to be depressed when you have a chronic disease. If you are depressed you may:  · Feel sad or hopeless. · Feel guilty or worthless. · Not enjoy the things you used to enjoy. · Feel hopeless, as though life is not worth living. · Have trouble thinking or remembering. · Have low energy, and you may not eat or sleep well. · Pull away from others. · Think often about death or killing yourself. (Keep the numbers for these national suicide hotlines: 6-655-096-TALK [1-233.473.8110] and 0-244-KNNXXQX [1-141.412.8549]. )  Get treatment  By treating your depression, you can feel more hopeful and have more energy. If you feel better, you may take better care of yourself, so your health may improve. · Talk to your doctor if you have any changes in mood during treatment for your disease. · Ask your doctor for help. Counseling, antidepressant medicine, or a combination of the two can help most people with depression. Often a combination works best. Counseling can also help you cope with having a chronic disease. When should you call for help? Call 911 anytime you think you may need emergency care. For example, call if:    · You feel like hurting yourself or someone else.     · Someone you know has depression and is about to attempt or is attempting suicide.    Call your doctor now or seek immediate medical care if:    · You hear voices.     · Someone you know has depression and:  ? Starts to give away his or her possessions. ? Uses illegal drugs or drinks alcohol heavily. ? Talks or writes about death, including writing suicide notes or talking about guns, knives, or pills. ? Starts to spend a lot of time alone. ? Acts very aggressively or suddenly appears calm. Watch closely for changes in your health, and be sure to contact your doctor if:    · You do not get better as expected. Where can you learn more? Go to http://www.gray.com/  Enter A548 in the search box to learn more about \"Depression and Chronic Disease: Care Instructions. \"  Current as of: June 16, 2021               Content Version: 13.2  © 9759-2510 [employer]. Care instructions adapted under license by [employer] (which disclaims liability or warranty for this information). If you have questions about a medical condition or this instruction, always ask your healthcare professional. [place] [address] any warranty or liability for your use of this information. Anxiety Disorder: Care Instructions  Your Care Instructions     Anxiety is a normal reaction to stress. Difficult situations can cause you to have symptoms such as sweaty palms and a nervous feeling. In an anxiety disorder, the symptoms are far more severe. Constant worry, muscle tension, trouble sleeping, nausea and diarrhea, and other symptoms can make normal daily activities difficult or impossible. These symptoms may occur for no reason, and they can affect your work, school, or social life. Medicines, counseling, and self-care can all help. Follow-up care is a key part of your treatment and safety. Be sure to make and go to all appointments, and call your doctor if you are having problems. It's also a good idea to know your test results and keep a list of the medicines you take. How can you care for yourself at home? · Take medicines exactly as directed.  Call your doctor if you think you are having a problem with your medicine. · Go to your counseling sessions and follow-up appointments. · Recognize and accept your anxiety. Then, when you are in a situation that makes you anxious, say to yourself, \"This is not an emergency. I feel uncomfortable, but I am not in danger. I can keep going even if I feel anxious. \"  · Be kind to your body:  ? Relieve tension with exercise or a massage. ? Get enough rest.  ? Avoid alcohol, caffeine, nicotine, and illegal drugs. They can increase your anxiety level and cause sleep problems. ? Learn and do relaxation techniques. See below for more about these techniques. · Engage your mind. Get out and do something you enjoy. Go to a funny movie, or take a walk or hike. Plan your day. Having too much or too little to do can make you anxious. · Keep a record of your symptoms. Discuss your fears with a good friend or family member, or join a support group for people with similar problems. Talking to others sometimes relieves stress. · Get involved in social groups, or volunteer to help others. Being alone sometimes makes things seem worse than they are. · Get at least 30 minutes of exercise on most days of the week to relieve stress. Walking is a good choice. You also may want to do other activities, such as running, swimming, cycling, or playing tennis or team sports. Relaxation techniques  Do relaxation exercises 10 to 20 minutes a day. You can play soothing, relaxing music while you do them, if you wish. · Tell others in your house that you are going to do your relaxation exercises. Ask them not to disturb you. · Find a comfortable place, away from all distractions and noise. · Lie down on your back, or sit with your back straight. · Focus on your breathing. Make it slow and steady. · Breathe in through your nose. Breathe out through either your nose or mouth. · Breathe deeply, filling up the area between your navel and your rib cage.  Breathe so that your belly goes up and down. · Do not hold your breath. · Breathe like this for 5 to 10 minutes. Notice the feeling of calmness throughout your whole body. As you continue to breathe slowly and deeply, relax by doing the following for another 5 to 10 minutes:  · Tighten and relax each muscle group in your body. You can begin at your toes and work your way up to your head. · Imagine your muscle groups relaxing and becoming heavy. · Empty your mind of all thoughts. · Let yourself relax more and more deeply. · Become aware of the state of calmness that surrounds you. · When your relaxation time is over, you can bring yourself back to alertness by moving your fingers and toes and then your hands and feet and then stretching and moving your entire body. Sometimes people fall asleep during relaxation, but they usually wake up shortly afterward. · Always give yourself time to return to full alertness before you drive a car or do anything that might cause an accident if you are not fully alert. Never play a relaxation tape while you drive a car. When should you call for help? Call 911 anytime you think you may need emergency care. For example, call if:    · You feel you cannot stop from hurting yourself or someone else. Keep the numbers for these national suicide hotlines: 6-880-165-TALK (6-420.263.5552) and 0-304-HNZPZRV (8-649.453.7486). If you or someone you know talks about suicide or feeling hopeless, get help right away. Watch closely for changes in your health, and be sure to contact your doctor if:    · You have anxiety or fear that affects your life.     · You have symptoms of anxiety that are new or different from those you had before. Where can you learn more? Go to http://www.404 Found!.com/  Enter P754 in the search box to learn more about \"Anxiety Disorder: Care Instructions. \"  Current as of: June 16, 2021               Content Version: 13.2  © 3791-9710 [employer]. Care instructions adapted under license by [employer] (which disclaims liability or warranty for this information). If you have questions about a medical condition or this instruction, always ask your healthcare professional. [address] any warranty or liability for your use of this information. Buspirone (By mouth)   Buspirone ([id number])  Treats anxiety. Brand Name(s):   There may be other brand names for this medicine. When This Medicine Should Not Be Used: You should not use this medicine if you have had an allergic reaction to buspirone. How to Use This Medicine:   Tablet  · Your doctor will tell you how much of this medicine to take and how often. Do not take more medicine or take it more often than your doctor tells you to. · You may take this medicine with or without food, but take it the same way each time. · You may need to take this medicine for 1 or 2 weeks before you begin to feel better. If a dose is missed:   · If you miss a dose or forget to take your medicine, take it as soon as you can. If it is almost time for your next dose, wait until then to take the medicine and skip the missed dose. · Do not use extra medicine to make up for a missed dose. How to Store and Dispose of This Medicine:   · Store the medicine at room temperature, away from heat, moisture, and direct light. · Keep all medicine out of the reach of children and never share your medicine with anyone. Drugs and Foods to Avoid:   Ask your doctor or pharmacist before using any other medicine, including over-the-counter medicines, vitamins, and herbal products. · You should not use buspirone when you are also using an MAO inhibitor (such as Eldepryl®, Marplan®, Nardil®, Parnate®). · Do not eat grapefruit, drink grapefruit juice, or drink alcohol while you are using buspirone.   · Make sure your doctor knows if you are also using cimetidine (Love Allis), dexamethasone (Decadron®), diltiazem (Ronne Lefort), erythromycin (Erythro-Tab®), itraconazole (Sporanox®), nefazodone (Serzone®), rifampin (Rifadin®, Rifamate®, Rifater®), verapamil (Calan®, Covera®), or medicine for seizures (such as Dilantin®, Luminal®, Tegretol®). · Make sure your doctor knows if you are using any medicines that make you sleepy (such as sleeping pills, cold and allergy medicine, narcotic pain relievers, or sedatives). Warnings While Using This Medicine:   · Make sure your doctor knows if you are pregnant or breastfeeding, or if you have kidney or liver disease. · Do not stop using this medicine suddenly without asking your doctor. You may need to slowly decrease your dose before stopping it completely. · This medicine may make you dizzy or drowsy. Avoid driving, using machines, or doing anything else that could be dangerous if you are not alert. Possible Side Effects While Using This Medicine:   Call your doctor right away if you notice any of these side effects:  · Fast or pounding heartbeat  · Numbness or tingling feeling  · Tremors or shaking  If you notice these less serious side effects, talk with your doctor:   · Drowsiness or weakness  · Dry mouth  · Feeling restless or nervous, trouble sleeping  · Headache  · Nausea, constipation, upset stomach  If you notice other side effects that you think are caused by this medicine, tell your doctor. Call your doctor for medical advice about side effects. You may report side effects to FDA at 4-743-FDA-4269  © 2017 [facility] Information is for End User's use only and may not be sold, redistributed or otherwise used for commercial purposes. The above information is an  only. It is not intended as medical advice for individual conditions or treatments. Talk to your doctor, nurse or pharmacist before following any medical regimen to see if it is safe and effective for you.   Sitagliptin (By mouth)   Sitagliptin (sit-a-GLIP-tin)  Treats type 2 diabetes. Brand Name(s): Januvia   There may be other brand names for this medicine. When This Medicine Should Not Be Used: This medicine is not right for everyone. Do not use it if you had an allergic reaction to sitagliptin. How to Use This Medicine:   Tablet  · Take your medicine as directed. Your dose may need to be changed several times to find what works best for you. · This medicine should come with a Medication Guide. Ask your pharmacist for a copy if you do not have one. · Missed dose: Take a dose as soon as you remember. If it is almost time for your next dose, wait until then and take a regular dose. Do not take extra medicine to make up for a missed dose. · Store the medicine in a closed container at room temperature, away from heat, moisture, and direct light. Drugs and Foods to Avoid:   Ask your doctor or pharmacist before using any other medicine, including over-the-counter medicines, vitamins, and herbal products. · Some medicines can affect how sitagliptin works. Tell your doctor if you are using digoxin or insulin or another diabetes medicine. Warnings While Using This Medicine:   · Tell your doctor if you are pregnant or breastfeeding, or if you have kidney disease, gallstones, or a history of heart failure or pancreas problems. · This medicine may cause the following problems:  ¨ Pancreatitis  ¨ Heart failure  ¨ Low blood sugar  ¨ Kidney problems  ¨ Serious skin reactions  · Your doctor will do lab tests at regular visits to check on the effects of this medicine. Keep all appointments. · Keep all medicine out of the reach of children. Never share your medicine with anyone.   Possible Side Effects While Using This Medicine:   Call your doctor right away if you notice any of these side effects:  · Allergic reaction: Itching or hives, swelling in your face or hands, swelling or tingling in your mouth or throat, chest tightness, trouble breathing  · Blistering, peeling, red skin rash  · Change in how much or how often you urinate  · Large, hard skin blisters  · Severe joint pain  · Shaking, trembling, sweating, fast or pounding heartbeat, faintness or lightheadedness, hunger, confusion  · Sudden and severe stomach pain, nausea, vomiting, fever  · Swelling in your hands, ankles, or feet  · Trouble breathing, cold sweat, bluish-colored skin  If you notice these less serious side effects, talk with your doctor:   · Cough, stuffy or runny nose, sore throat  · Headache  If you notice other side effects that you think are caused by this medicine, tell your doctor. Call your doctor for medical advice about side effects. You may report side effects to FDA at 4-537-FDA-9812  © 2017 [facility] Information is for End User's use only and may not be sold, redistributed or otherwise used for commercial purposes. The above information is an  only. It is not intended as medical advice for individual conditions or treatments. Talk to your doctor, nurse or pharmacist before following any medical regimen to see if it is safe and effective for you.

## 2022-05-27 RX ORDER — TADALAFIL 20 MG/1
20 TABLET ORAL
Qty: 18 TABLET | Refills: 0 | Status: SHIPPED | OUTPATIENT
Start: 2022-05-27 | End: 2022-06-26
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
## 2022-10-21 ENCOUNTER — OFFICE VISIT (OUTPATIENT)
Dept: INTERNAL MEDICINE CLINIC | Age: 42
End: 2022-10-21
Payer: COMMERCIAL

## 2022-10-21 VITALS
BODY MASS INDEX: 21.43 KG/M2 | DIASTOLIC BLOOD PRESSURE: 82 MMHG | HEART RATE: 109 BPM | HEIGHT: 67 IN | WEIGHT: 136.5 LBS | OXYGEN SATURATION: 99 % | SYSTOLIC BLOOD PRESSURE: 144 MMHG

## 2022-10-21 DIAGNOSIS — Z79.4 TYPE 2 DIABETES MELLITUS WITH DIABETIC POLYNEUROPATHY, WITH LONG-TERM CURRENT USE OF INSULIN (HCC): ICD-10-CM

## 2022-10-21 DIAGNOSIS — F43.10 PTSD (POST-TRAUMATIC STRESS DISORDER): ICD-10-CM

## 2022-10-21 DIAGNOSIS — F41.8 DEPRESSION WITH ANXIETY: ICD-10-CM

## 2022-10-21 DIAGNOSIS — Z00.00 ROUTINE ADULT HEALTH MAINTENANCE: Primary | ICD-10-CM

## 2022-10-21 DIAGNOSIS — E78.5 DYSLIPIDEMIA: ICD-10-CM

## 2022-10-21 DIAGNOSIS — I10 PRIMARY HYPERTENSION: ICD-10-CM

## 2022-10-21 DIAGNOSIS — E11.42 TYPE 2 DIABETES MELLITUS WITH DIABETIC POLYNEUROPATHY, WITH LONG-TERM CURRENT USE OF INSULIN (HCC): ICD-10-CM

## 2022-10-21 DIAGNOSIS — F17.200 CURRENT SMOKER: ICD-10-CM

## 2022-10-21 DIAGNOSIS — Z71.6 ENCOUNTER FOR SMOKING CESSATION COUNSELING: ICD-10-CM

## 2022-10-21 PROCEDURE — 3046F HEMOGLOBIN A1C LEVEL >9.0%: CPT | Performed by: INTERNAL MEDICINE

## 2022-10-21 PROCEDURE — 99215 OFFICE O/P EST HI 40 MIN: CPT | Performed by: INTERNAL MEDICINE

## 2022-10-21 RX ORDER — TRAZODONE HYDROCHLORIDE 50 MG/1
TABLET ORAL
COMMUNITY
Start: 2022-09-25

## 2022-10-21 RX ORDER — OLANZAPINE 5 MG/1
TABLET ORAL
COMMUNITY
Start: 2022-09-25

## 2022-10-21 RX ORDER — METFORMIN HYDROCHLORIDE 500 MG/1
1000 TABLET, EXTENDED RELEASE ORAL 2 TIMES DAILY WITH MEALS
Qty: 180 TABLET | Refills: 3 | Status: SHIPPED | OUTPATIENT
Start: 2022-10-21

## 2022-10-21 RX ORDER — SILDENAFIL 50 MG/1
50 TABLET, FILM COATED ORAL
Qty: 18 TABLET | Refills: 11 | Status: SHIPPED | OUTPATIENT
Start: 2022-10-21 | End: 2023-10-16

## 2022-10-21 RX ORDER — PEN NEEDLE, DIABETIC 31 GX3/16"
NEEDLE, DISPOSABLE MISCELLANEOUS
Qty: 100 PEN NEEDLE | Refills: 11 | Status: SHIPPED | OUTPATIENT
Start: 2022-10-21

## 2022-10-21 RX ORDER — HYDROXYZINE 50 MG/1
TABLET, FILM COATED ORAL
COMMUNITY
Start: 2022-09-25

## 2022-10-21 RX ORDER — ATORVASTATIN CALCIUM 40 MG/1
40 TABLET, FILM COATED ORAL DAILY
Qty: 90 TABLET | Refills: 3 | Status: SHIPPED | OUTPATIENT
Start: 2022-10-21 | End: 2023-10-16

## 2022-10-21 RX ORDER — INSULIN GLARGINE 100 [IU]/ML
INJECTION, SOLUTION SUBCUTANEOUS
Qty: 3 ADJUSTABLE DOSE PRE-FILLED PEN SYRINGE | Refills: 5 | Status: SHIPPED | OUTPATIENT
Start: 2022-10-21 | End: 2023-10-16

## 2022-10-21 RX ORDER — SERTRALINE HYDROCHLORIDE 25 MG/1
25 TABLET, FILM COATED ORAL DAILY
COMMUNITY
Start: 2022-09-25 | End: 2022-10-21

## 2022-10-21 RX ORDER — SERTRALINE HYDROCHLORIDE 50 MG/1
50 TABLET, FILM COATED ORAL DAILY
Qty: 90 TABLET | Refills: 3 | Status: SHIPPED | OUTPATIENT
Start: 2022-10-21 | End: 2023-10-16

## 2022-10-21 RX ORDER — BUSPIRONE HYDROCHLORIDE 5 MG/1
5 TABLET ORAL 2 TIMES DAILY
Qty: 60 TABLET | Refills: 11 | Status: SHIPPED | OUTPATIENT
Start: 2022-10-21 | End: 2023-10-16

## 2022-10-21 NOTE — PROGRESS NOTES
Chief Complaint   Patient presents with    Follow-up       1. \"Have you been to the ER, urgent care clinic since your last visit? Hospitalized since your last visit? \"  [facility]     2. \"Have you seen or consulted any other health care providers outside of the [address] [place] [address] since your last visit? \" No     3. For patients aged 39-70: Has the patient had a colonoscopy / FIT/ Cologuard? No      If the patient is female:    4. For patients aged 41-77: Has the patient had a mammogram within the past 2 years? No      5. For patients aged 21-65: Has the patient had a pap smear?  No

## 2022-10-22 LAB
ALBUMIN SERPL-MCNC: 3.6 G/DL (ref 3.5–5)
ALBUMIN/GLOB SERPL: 0.7 {RATIO} (ref 1.1–2.2)
ALP SERPL-CCNC: 104 U/L (ref 45–117)
ALT SERPL-CCNC: 36 U/L (ref 12–78)
ANION GAP SERPL CALC-SCNC: 5 MMOL/L (ref 5–15)
AST SERPL-CCNC: 20 U/L (ref 15–37)
BILIRUB SERPL-MCNC: 0.4 MG/DL (ref 0.2–1)
BUN SERPL-MCNC: 8 MG/DL (ref 6–20)
BUN/CREAT SERPL: 7 (ref 12–20)
CALCIUM SERPL-MCNC: 10.2 MG/DL (ref 8.5–10.1)
CHLORIDE SERPL-SCNC: 98 MMOL/L (ref 97–108)
CO2 SERPL-SCNC: 30 MMOL/L (ref 21–32)
CREAT SERPL-MCNC: 1.08 MG/DL (ref 0.7–1.3)
EST. AVERAGE GLUCOSE BLD GHB EST-MCNC: 278 MG/DL
GLOBULIN SER CALC-MCNC: 5.1 G/DL (ref 2–4)
GLUCOSE SERPL-MCNC: 404 MG/DL (ref 65–100)
HBA1C MFR BLD: 11.3 % (ref 4–5.6)
POTASSIUM SERPL-SCNC: 4.6 MMOL/L (ref 3.5–5.1)
PROT SERPL-MCNC: 8.7 G/DL (ref 6.4–8.2)
SODIUM SERPL-SCNC: 133 MMOL/L (ref 136–145)

## 2022-10-23 NOTE — PROGRESS NOTES
Notify patient. His A1c is very slightly improved, but still is consistent with severe diabetes mellitus, which is uncontrolled otherwise. He was previously not taking many of the medications that had been prescribed for him. He needs to resume taking his insulin as instructed along with the Januvia please assist by having the patient scheduled for a follow up appointment within the next month or two to begin making additional titration adjustments to his insulin regimen. Amalia Alfredo
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
## 2022-11-23 ENCOUNTER — OFFICE VISIT (OUTPATIENT)
Dept: INTERNAL MEDICINE CLINIC | Age: 42
End: 2022-11-23
Payer: COMMERCIAL

## 2022-11-23 VITALS
HEART RATE: 92 BPM | HEIGHT: 67 IN | OXYGEN SATURATION: 99 % | BODY MASS INDEX: 21.17 KG/M2 | RESPIRATION RATE: 18 BRPM | SYSTOLIC BLOOD PRESSURE: 130 MMHG | WEIGHT: 134.9 LBS | DIASTOLIC BLOOD PRESSURE: 88 MMHG

## 2022-11-23 DIAGNOSIS — F43.10 PTSD (POST-TRAUMATIC STRESS DISORDER): ICD-10-CM

## 2022-11-23 DIAGNOSIS — E11.42 TYPE 2 DIABETES MELLITUS WITH DIABETIC POLYNEUROPATHY, WITH LONG-TERM CURRENT USE OF INSULIN (HCC): ICD-10-CM

## 2022-11-23 DIAGNOSIS — Z00.00 ROUTINE ADULT HEALTH MAINTENANCE: Primary | ICD-10-CM

## 2022-11-23 DIAGNOSIS — Z79.4 TYPE 2 DIABETES MELLITUS WITH DIABETIC POLYNEUROPATHY, WITH LONG-TERM CURRENT USE OF INSULIN (HCC): ICD-10-CM

## 2022-11-23 DIAGNOSIS — Z71.6 ENCOUNTER FOR SMOKING CESSATION COUNSELING: ICD-10-CM

## 2022-11-23 DIAGNOSIS — I10 PRIMARY HYPERTENSION: ICD-10-CM

## 2022-11-23 DIAGNOSIS — F41.8 DEPRESSION WITH ANXIETY: ICD-10-CM

## 2022-11-23 DIAGNOSIS — E78.5 DYSLIPIDEMIA: ICD-10-CM

## 2022-11-23 DIAGNOSIS — F17.200 CURRENT SMOKER: ICD-10-CM

## 2022-11-23 PROCEDURE — 3079F DIAST BP 80-89 MM HG: CPT | Performed by: INTERNAL MEDICINE

## 2022-11-23 PROCEDURE — 3074F SYST BP LT 130 MM HG: CPT | Performed by: INTERNAL MEDICINE

## 2022-11-23 PROCEDURE — 99214 OFFICE O/P EST MOD 30 MIN: CPT | Performed by: INTERNAL MEDICINE

## 2022-11-23 PROCEDURE — 3046F HEMOGLOBIN A1C LEVEL >9.0%: CPT | Performed by: INTERNAL MEDICINE

## 2022-11-23 NOTE — PROGRESS NOTES
Chief Complaint   Patient presents with    Follow-up       1. \"Have you been to the ER, urgent care clinic since your last visit? Hospitalized since your last visit? \" No    2. \"Have you seen or consulted any other health care providers outside of the [address] [place] [address] since your last visit? \" No     3. For patients aged 39-70: Has the patient had a colonoscopy / FIT/ Cologuard? No      If the patient is female:    4. For patients aged 41-77: Has the patient had a mammogram within the past 2 years? No      5. For patients aged 21-65: Has the patient had a pap smear?  No

## 2022-11-23 NOTE — PATIENT INSTRUCTIONS
Varenicline (By mouth)   Varenicline ([id number]-l-kleen)  Helps you quit smoking, as part of a support program.   Brand Name(s): Chantix, Chantix Starting Month Wisam   There may be other brand names for this medicine. When This Medicine Should Not Be Used: This medicine is not right for everyone. Do not use it if you had an allergic reaction to varenicline. How to Use This Medicine:   Tablet  Take your medicine as directed. Your dose or schedule may need to be changed to find what works best for you. Tell your doctor what date you have set to stop smoking. This medicine needs to be started 1 week before that date. It is best to take this medicine with a full glass of water after you eat. This medicine should come with a Medication Guide. Ask your pharmacist for a copy if you do not have one. Missed dose: Take a dose as soon as you remember. If it is almost time for your next dose, wait until then and take a regular dose. Do not take extra medicine to make up for a missed dose. Store the medicine in a closed container at room temperature, away from heat, moisture, and direct light. Drugs and Foods to Avoid:   Ask your doctor or pharmacist before using any other medicine, including over-the-counter medicines, vitamins, and herbal products. Some medicines can affect how varenicline works. Tell your doctor if you are using any of the following:  Insulin  Theophylline  Blood thinner (including warfarin)  This medicine can affect your ability to tolerate alcohol. Limit the amount of alcohol that you drink until you know how this medicine affects you. Warnings While Using This Medicine:   Tell your doctor if you are pregnant or breastfeeding, or if you have kidney disease, heart or blood vessel problems, angina, or a history of heart attack, stroke, depression or mental health problems, or seizures. For some people, this medicine may increase mental or emotional problems.  This may lead to thoughts of suicide and violence. Talk with your doctor right away if you have any thought or behavior changes that concern you. Tell your doctor if you or anyone in your family has a history of bipolar disorder or suicide attempts. This medicine may cause the following problems:  Increased risk of heart attack or stroke  Serious skin reactions  Sleepwalking  This medicine may cause you to become dizzy or drowsy, or have trouble concentrating. Do not drive or do anything else that could be dangerous until you know this medicine affects you. Your doctor will check your progress and the effects of this medicine at regular visits. Keep all appointments. Keep all medicine out of the reach of children. Never share your medicine with anyone. Possible Side Effects While Using This Medicine:   Call your doctor right away if you notice any of these side effects: Allergic reaction: Itching or hives, swelling in your face or hands, swelling or tingling in your mouth or throat, chest tightness, trouble breathing  Blistering, peeling, red skin rash  Chest pain, fast, pounding, or uneven heartbeat  Feeling anxious, depressed, restless, or irritable  Numbness or weakness on one side of your body, sudden or severe headache, problems with vision, speech, or walking  Seeing or hearing things that are not really there  Seizures  Thoughts of hurting yourself or others, unusual moods or behaviors  If you notice these less serious side effects, talk with your doctor:   Headache  Nausea, gas  Trouble sleeping, unusual dreams, sleepwalking  If you notice other side effects that you think are caused by this medicine, tell your doctor. Call your doctor for medical advice about side effects. You may report side effects to FDA at 3-166-AMG-4350  © 2017 [facility] Information is for End User's use only and may not be sold, redistributed or otherwise used for commercial purposes. The above information is an  only.  It is not intended as medical advice for individual conditions or treatments. Talk to your doctor, nurse or pharmacist before following any medical regimen to see if it is safe and effective for you.

## 2022-12-07 ENCOUNTER — OFFICE VISIT (OUTPATIENT)
Dept: INTERNAL MEDICINE CLINIC | Age: 42
End: 2022-12-07
Payer: COMMERCIAL

## 2022-12-07 VITALS
HEART RATE: 120 BPM | BODY MASS INDEX: 21.03 KG/M2 | WEIGHT: 134 LBS | DIASTOLIC BLOOD PRESSURE: 80 MMHG | RESPIRATION RATE: 18 BRPM | HEIGHT: 67 IN | OXYGEN SATURATION: 98 % | SYSTOLIC BLOOD PRESSURE: 124 MMHG

## 2022-12-07 DIAGNOSIS — I10 PRIMARY HYPERTENSION: ICD-10-CM

## 2022-12-07 DIAGNOSIS — E11.42 TYPE 2 DIABETES MELLITUS WITH DIABETIC POLYNEUROPATHY, WITH LONG-TERM CURRENT USE OF INSULIN (HCC): ICD-10-CM

## 2022-12-07 DIAGNOSIS — Z79.4 TYPE 2 DIABETES MELLITUS WITH DIABETIC POLYNEUROPATHY, WITH LONG-TERM CURRENT USE OF INSULIN (HCC): ICD-10-CM

## 2022-12-07 DIAGNOSIS — F43.10 PTSD (POST-TRAUMATIC STRESS DISORDER): ICD-10-CM

## 2022-12-07 DIAGNOSIS — F17.200 CURRENT SMOKER: Primary | ICD-10-CM

## 2022-12-07 DIAGNOSIS — E78.5 DYSLIPIDEMIA: ICD-10-CM

## 2022-12-07 DIAGNOSIS — F41.8 DEPRESSION WITH ANXIETY: ICD-10-CM

## 2022-12-07 PROCEDURE — 99214 OFFICE O/P EST MOD 30 MIN: CPT | Performed by: INTERNAL MEDICINE

## 2022-12-07 PROCEDURE — 3079F DIAST BP 80-89 MM HG: CPT | Performed by: INTERNAL MEDICINE

## 2022-12-07 PROCEDURE — 3074F SYST BP LT 130 MM HG: CPT | Performed by: INTERNAL MEDICINE

## 2022-12-07 PROCEDURE — 3046F HEMOGLOBIN A1C LEVEL >9.0%: CPT | Performed by: INTERNAL MEDICINE
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
## 2022-12-07 NOTE — PROGRESS NOTES
Chief Complaint   Patient presents with    Follow-up     Fill out disability paperwork         1. \"Have you been to the ER, urgent care clinic since your last visit? Hospitalized since your last visit? \" No    2. \"Have you seen or consulted any other health care providers outside of the [address] [place] [address] since your last visit? \" No     3. For patients aged 39-70: Has the patient had a colonoscopy / FIT/ Cologuard? No      If the patient is female:    4. For patients aged 41-77: Has the patient had a mammogram within the past 2 years? No      5. For patients aged 21-65: Has the patient had a pap smear?  No